# Patient Record
Sex: MALE | Race: WHITE | NOT HISPANIC OR LATINO | Employment: UNEMPLOYED | URBAN - METROPOLITAN AREA
[De-identification: names, ages, dates, MRNs, and addresses within clinical notes are randomized per-mention and may not be internally consistent; named-entity substitution may affect disease eponyms.]

---

## 2017-01-22 ENCOUNTER — HOSPITAL ENCOUNTER (INPATIENT)
Facility: HOSPITAL | Age: 57
LOS: 2 days | Discharge: HOME | DRG: 123 | End: 2017-01-24
Attending: EMERGENCY MEDICINE | Admitting: PSYCHIATRY & NEUROLOGY
Payer: COMMERCIAL

## 2017-01-22 ENCOUNTER — TELEMEDICINE (OUTPATIENT)
Dept: TELEMEDICINE | Facility: OTHER | Age: 57
End: 2017-01-22

## 2017-01-22 DIAGNOSIS — E11.9 CONTROLLED TYPE 2 DIABETES MELLITUS WITHOUT COMPLICATION, WITHOUT LONG-TERM CURRENT USE OF INSULIN: ICD-10-CM

## 2017-01-22 DIAGNOSIS — E78.2 MIXED HYPERLIPIDEMIA: ICD-10-CM

## 2017-01-22 DIAGNOSIS — I10 ESSENTIAL HYPERTENSION: ICD-10-CM

## 2017-01-22 DIAGNOSIS — H54.61 VISION LOSS OF RIGHT EYE: Primary | ICD-10-CM

## 2017-01-22 DIAGNOSIS — H34.10 CENTRAL RETINAL ARTERY OCCLUSION, UNSPECIFIED LATERALITY: ICD-10-CM

## 2017-01-22 DIAGNOSIS — H34.11 CENTRAL RETINAL ARTERY OCCLUSION OF RIGHT EYE: ICD-10-CM

## 2017-01-22 PROBLEM — H34.231 RETINAL ARTERY BRANCH OCCLUSION OF RIGHT EYE: Status: ACTIVE | Noted: 2017-01-22

## 2017-01-22 PROBLEM — I63.132 STROKE DUE TO EMBOLISM OF LEFT CAROTID ARTERY: Status: RESOLVED | Noted: 2017-01-22 | Resolved: 2017-01-22

## 2017-01-22 PROBLEM — I63.132 STROKE DUE TO EMBOLISM OF LEFT CAROTID ARTERY: Status: ACTIVE | Noted: 2017-01-22

## 2017-01-22 LAB
ABO + RH BLD: NORMAL
ALBUMIN SERPL BCP-MCNC: 4.2 G/DL
ALP SERPL-CCNC: 74 U/L
ALT SERPL W/O P-5'-P-CCNC: 45 U/L
ANION GAP SERPL CALC-SCNC: 10 MMOL/L
AST SERPL-CCNC: 37 U/L
BASOPHILS # BLD AUTO: 0.02 K/UL
BASOPHILS NFR BLD: 0.3 %
BILIRUB SERPL-MCNC: 0.8 MG/DL
BLD GP AB SCN CELLS X3 SERPL QL: NORMAL
BUN SERPL-MCNC: 14 MG/DL
CALCIUM SERPL-MCNC: 9.4 MG/DL
CHLORIDE SERPL-SCNC: 99 MMOL/L
CHOLEST/HDLC SERPL: 2.4 {RATIO}
CO2 SERPL-SCNC: 27 MMOL/L
CREAT SERPL-MCNC: 0.8 MG/DL
DIFFERENTIAL METHOD: ABNORMAL
EOSINOPHIL # BLD AUTO: 0.1 K/UL
EOSINOPHIL NFR BLD: 1.7 %
ERYTHROCYTE [DISTWIDTH] IN BLOOD BY AUTOMATED COUNT: 12 %
EST. GFR  (AFRICAN AMERICAN): >60 ML/MIN/1.73 M^2
EST. GFR  (NON AFRICAN AMERICAN): >60 ML/MIN/1.73 M^2
GLUCOSE SERPL-MCNC: 132 MG/DL
HCT VFR BLD AUTO: 43.3 %
HDL/CHOLESTEROL RATIO: 41.6 %
HDLC SERPL-MCNC: 137 MG/DL
HDLC SERPL-MCNC: 57 MG/DL
HGB BLD-MCNC: 16 G/DL
INR PPP: 1
LDLC SERPL CALC-MCNC: 53.4 MG/DL
LIPASE SERPL-CCNC: 8 U/L
LYMPHOCYTES # BLD AUTO: 1.2 K/UL
LYMPHOCYTES NFR BLD: 18.5 %
MAGNESIUM SERPL-MCNC: 1.8 MG/DL
MCH RBC QN AUTO: 32.7 PG
MCHC RBC AUTO-ENTMCNC: 37 %
MCV RBC AUTO: 88 FL
MONOCYTES # BLD AUTO: 0.9 K/UL
MONOCYTES NFR BLD: 13.2 %
NEUTROPHILS # BLD AUTO: 4.3 K/UL
NEUTROPHILS NFR BLD: 66 %
NONHDLC SERPL-MCNC: 80 MG/DL
PLATELET # BLD AUTO: 196 K/UL
PMV BLD AUTO: 9.4 FL
POCT GLUCOSE: 117 MG/DL (ref 70–110)
POTASSIUM SERPL-SCNC: 3.7 MMOL/L
PROT SERPL-MCNC: 7.3 G/DL
PROTHROMBIN TIME: 10.4 SEC
RBC # BLD AUTO: 4.9 M/UL
SODIUM SERPL-SCNC: 136 MMOL/L
TRIGL SERPL-MCNC: 133 MG/DL
TSH SERPL DL<=0.005 MIU/L-ACNC: 2.1 UIU/ML
WBC # BLD AUTO: 6.53 K/UL

## 2017-01-22 PROCEDURE — 86900 BLOOD TYPING SEROLOGIC ABO: CPT

## 2017-01-22 PROCEDURE — 83690 ASSAY OF LIPASE: CPT

## 2017-01-22 PROCEDURE — 85025 COMPLETE CBC W/AUTO DIFF WBC: CPT

## 2017-01-22 PROCEDURE — 80053 COMPREHEN METABOLIC PANEL: CPT

## 2017-01-22 PROCEDURE — 25000003 PHARM REV CODE 250: Performed by: STUDENT IN AN ORGANIZED HEALTH CARE EDUCATION/TRAINING PROGRAM

## 2017-01-22 PROCEDURE — 99291 CRITICAL CARE FIRST HOUR: CPT | Mod: ,,, | Performed by: EMERGENCY MEDICINE

## 2017-01-22 PROCEDURE — 96374 THER/PROPH/DIAG INJ IV PUSH: CPT

## 2017-01-22 PROCEDURE — 86901 BLOOD TYPING SEROLOGIC RH(D): CPT

## 2017-01-22 PROCEDURE — 99223 1ST HOSP IP/OBS HIGH 75: CPT | Mod: ,,, | Performed by: PSYCHIATRY & NEUROLOGY

## 2017-01-22 PROCEDURE — 80061 LIPID PANEL: CPT

## 2017-01-22 PROCEDURE — 85610 PROTHROMBIN TIME: CPT

## 2017-01-22 PROCEDURE — 96361 HYDRATE IV INFUSION ADD-ON: CPT

## 2017-01-22 PROCEDURE — 99223 1ST HOSP IP/OBS HIGH 75: CPT | Mod: ,,, | Performed by: NEUROLOGICAL SURGERY

## 2017-01-22 PROCEDURE — 83036 HEMOGLOBIN GLYCOSYLATED A1C: CPT

## 2017-01-22 PROCEDURE — 12000002 HC ACUTE/MED SURGE SEMI-PRIVATE ROOM

## 2017-01-22 PROCEDURE — 99243 OFF/OP CNSLTJ NEW/EST LOW 30: CPT | Mod: GT,,, | Performed by: PSYCHIATRY & NEUROLOGY

## 2017-01-22 PROCEDURE — 93010 ELECTROCARDIOGRAM REPORT: CPT | Mod: ,,, | Performed by: INTERNAL MEDICINE

## 2017-01-22 PROCEDURE — 84443 ASSAY THYROID STIM HORMONE: CPT

## 2017-01-22 PROCEDURE — 83735 ASSAY OF MAGNESIUM: CPT

## 2017-01-22 PROCEDURE — 99291 CRITICAL CARE FIRST HOUR: CPT | Mod: 25

## 2017-01-22 RX ORDER — SODIUM CHLORIDE 9 MG/ML
INJECTION, SOLUTION INTRAVENOUS CONTINUOUS
Status: DISCONTINUED | OUTPATIENT
Start: 2017-01-22 | End: 2017-01-24 | Stop reason: HOSPADM

## 2017-01-22 RX ORDER — INSULIN ASPART 100 [IU]/ML
1-10 INJECTION, SOLUTION INTRAVENOUS; SUBCUTANEOUS
Status: DISCONTINUED | OUTPATIENT
Start: 2017-01-22 | End: 2017-01-24 | Stop reason: HOSPADM

## 2017-01-22 RX ORDER — LISINOPRIL 20 MG/1
20 TABLET ORAL DAILY
Status: DISCONTINUED | OUTPATIENT
Start: 2017-01-23 | End: 2017-01-24 | Stop reason: HOSPADM

## 2017-01-22 RX ORDER — LABETALOL HYDROCHLORIDE 5 MG/ML
10 INJECTION, SOLUTION INTRAVENOUS EVERY 4 HOURS PRN
Status: DISCONTINUED | OUTPATIENT
Start: 2017-01-22 | End: 2017-01-24 | Stop reason: HOSPADM

## 2017-01-22 RX ORDER — ATORVASTATIN CALCIUM 10 MG/1
10 TABLET, FILM COATED ORAL DAILY
Status: ON HOLD | COMMUNITY
End: 2017-01-24 | Stop reason: HOSPADM

## 2017-01-22 RX ORDER — GLUCAGON 1 MG
1 KIT INJECTION
Status: DISCONTINUED | OUTPATIENT
Start: 2017-01-22 | End: 2017-01-24 | Stop reason: HOSPADM

## 2017-01-22 RX ORDER — ASPIRIN 81 MG/1
81 TABLET ORAL DAILY
Status: ON HOLD | COMMUNITY
End: 2017-01-24 | Stop reason: HOSPADM

## 2017-01-22 RX ORDER — ATORVASTATIN CALCIUM 20 MG/1
40 TABLET, FILM COATED ORAL DAILY
Status: DISCONTINUED | OUTPATIENT
Start: 2017-01-23 | End: 2017-01-24 | Stop reason: HOSPADM

## 2017-01-22 RX ORDER — METFORMIN HYDROCHLORIDE 850 MG/1
850 TABLET ORAL 2 TIMES DAILY WITH MEALS
COMMUNITY

## 2017-01-22 RX ORDER — AMLODIPINE BESYLATE 5 MG/1
5 TABLET ORAL DAILY
Status: DISCONTINUED | OUTPATIENT
Start: 2017-01-23 | End: 2017-01-22

## 2017-01-22 RX ORDER — SODIUM CHLORIDE 0.9 % (FLUSH) 0.9 %
3 SYRINGE (ML) INJECTION EVERY 8 HOURS
Status: DISCONTINUED | OUTPATIENT
Start: 2017-01-22 | End: 2017-01-24 | Stop reason: HOSPADM

## 2017-01-22 RX ORDER — IBUPROFEN 200 MG
24 TABLET ORAL
Status: DISCONTINUED | OUTPATIENT
Start: 2017-01-22 | End: 2017-01-24 | Stop reason: HOSPADM

## 2017-01-22 RX ORDER — AMLODIPINE BESYLATE 10 MG/1
10 TABLET ORAL DAILY
Status: DISCONTINUED | OUTPATIENT
Start: 2017-01-23 | End: 2017-01-24 | Stop reason: HOSPADM

## 2017-01-22 RX ORDER — ONDANSETRON 2 MG/ML
8 INJECTION INTRAMUSCULAR; INTRAVENOUS EVERY 8 HOURS PRN
Status: DISCONTINUED | OUTPATIENT
Start: 2017-01-22 | End: 2017-01-24 | Stop reason: HOSPADM

## 2017-01-22 RX ORDER — AMLODIPINE BESYLATE 10 MG/1
10 TABLET ORAL DAILY
COMMUNITY

## 2017-01-22 RX ORDER — AMOXICILLIN 250 MG
1 CAPSULE ORAL 2 TIMES DAILY
Status: DISCONTINUED | OUTPATIENT
Start: 2017-01-22 | End: 2017-01-24 | Stop reason: HOSPADM

## 2017-01-22 RX ORDER — HYDRALAZINE HYDROCHLORIDE 20 MG/ML
10 INJECTION INTRAMUSCULAR; INTRAVENOUS EVERY 4 HOURS PRN
Status: DISCONTINUED | OUTPATIENT
Start: 2017-01-22 | End: 2017-01-24 | Stop reason: HOSPADM

## 2017-01-22 RX ORDER — IBUPROFEN 200 MG
16 TABLET ORAL
Status: DISCONTINUED | OUTPATIENT
Start: 2017-01-22 | End: 2017-01-24 | Stop reason: HOSPADM

## 2017-01-22 RX ORDER — TERAZOSIN 5 MG/1
5 CAPSULE ORAL NIGHTLY
COMMUNITY

## 2017-01-22 RX ADMIN — Medication 3 ML: at 10:01

## 2017-01-22 RX ADMIN — SODIUM CHLORIDE: 0.9 INJECTION, SOLUTION INTRAVENOUS at 04:01

## 2017-01-22 RX ADMIN — STANDARDIZED SENNA CONCENTRATE AND DOCUSATE SODIUM 1 TABLET: 8.6; 5 TABLET, FILM COATED ORAL at 10:01

## 2017-01-22 NOTE — TELEMEDICINE CONSULT
[facility]/Vascular Neurology  Tele-Consult    Consultation started: 1/22/2017 at 11:16 AM   Consulting Provider:  Andi  The patient location is [facility] Emergency Department  Spoke hospital nurse at bedside with patient assisting consultant.     Subjective:   Subjective   History of Present Illness:  Prince Corona is a 57 y.o. male who presents with R monocular vision loss after sneezing at 0800 this AM. He sneezed and then blew his nose and then suddenly realized that he couldn't see out of his right eye. According to physical examination and fundoscopy from the ED physician, there is no evidence of retinal detachment or vitreous hemorrhage however there is clear changes that appear to be pale changes consistent with ischemia of the affected eye.    These symptoms have never happened before. There are no identified triggers or modifying factors. There have been no recurrent events. There are no other associated symptoms.    Wake up Stroke?: no  Last known normal:  0800  Recent bleeding noted: no  Does the patient take any Blood Thinners? no         H&P was obtained from patient.  Past Medical History: hypertension, diabetes and high cholesterol  Norvasc, aspirin, lipitor, glucphage    Medications: No current outpatient prescriptions on file.    Allergies: Review of patient's allergies indicates:  Allergies not on file    Objective:     Vitals: There were no vitals filed for this visit.     Objective   Physical Exam:  General: well developed, well nourished   HENT: Head:normocephalic and atraumatic   HENT: Ears: right ear normal and left ear normal  Nose: normal nares and no discharge  Eyes:conjunctivae/corneas clear. PERRL.  Neck: normal, no obvious masses and trachea to midline  Lungs: normal respiratory effort  Cardiovascular: Heart: regular rate and rhythm   Cardiovascular: Extremities: no cyanosis, no edema and no clubbing  Abdomen: appears normal and not distended  Skin:  skin color and texture  normal, no rash and no bruises  Musculoskeletal: normal range of motion in all extremities       Imaging Notes: No hemmorhage. No mass effect. No early infarct signs. Impression performed at: 1143    NIH Stroke Scale:  Interval: baseline (upon arrival/admit)  Level of Consciousness: 0 - alert  LOC Questions: 0 - answers both correctly  LOC Commands: 0 - performs both correctly  Best Gaze: 0 - normal  Visual: 1 - partial hemianopia  Facial Palsy: 0 - normal  Motor Left Arm: 0 - no drift  Motor Right Arm: 0 - no drift  Motor Left Le - no drift  Motor Right Le - no drift  Limb Ataxia: 0 - absent  Sensory: 0 - normal  Best Language: 0 - no aphasia  Dysarthria: 0 - normal articulation  Extinction and Inattention: 0 - no neglect  NIH Stroke Scale Total: 1        Assessment - Diagnosis - Goals:     Plan     Diagnosis/Impression: JACOB AVERY; discussed at length with patient regarding risk and benefit of IVtPA; we discussed the probability that the condition may be a blood clot that is blocking blood flow to the eye and that this is the standard treatment and that if symptoms persist, catheterization is a potential treatment as well; case has been discussed w/ Dr. Bowen at [facility] re: potential.    Also I requested and reviewed myself the images of the CTA as they were acquired - they do not show an ipsilateral dissection of the extracranial carotid artery; there is mild atherosclerotic changes bilaterally at the origins of the ICA    TPA Recommendation:   tPA Recommendation   tPA Total Dose:          Bolus Dose       Intravenously over 1 minute (10% of Total Dose)   Infusion Dose       Continuous Infusion over 60 Minutes     Additional Recommendations:   1. Neurological assessment and vital signs (except temperature) every 15 minutes during tPA infusion.  2. Frequency of BP assessments may need to be increased if systolic BP stays >= 180 mm Hg or diastolic BP stays >= 105 mm Hg. Administer antihypertensive meds as  ordered  3. Continue to monitor and control blood pressure and monitor for neurological deterioration every 15 minutes for the first hour after the infusion is stopped. Then every 30 minutes for the next 6 hours. Perform hourly monitoring from the 8th post-infusion hour until 24 hours post-infusion.  4. Temperature every 4 hours or as required.  5. Follow hospital protocol for further orders re: post tPA infusion patient management.  6. No antithrombotics or anticoagulants (including but not limited to: heparin, warfarin, aspirin, clopidigrel, or dipyridamole) for 24 hours, then start antithrombotics as ordered by treating physician    Adapted from the American Heart Association/American Stroke Association (AHA/ASA) and American Association of Neuroscience Nurses (AANN) Guidelines.  Recommendation given at: 1145    Recommendation: NPO until after pass dysphagia screen. Diagnostic studies: CTA Head to assess vasculature , CTA Neck/Arch to assess vasculature, HgbA1C to assess blood glucose levels, Lipid Profile to assess cholesterol levels, MRI head without contrast to assess brain parenchyma, Trans-thoracic cardiac echo to assess cardiac function/status  Consults: Rehab Consult; Occupational Therapy and Rehab Consult; Physical Therapy  Antithrombotics: Hold all Antithrombotics x 24 hrs after IV t-PA given  Statins: Atorvastatin- 40 mg oral daily    Disposition Recommendation:  transfer to [facility] by  air  stat       Possible Interventional Revascularization Candidate? Yes    Recommended the emergency room physician to have a brief discussion with the patient and/or family if available regarding the risks and benefits of treatment, and to briefly document the occurrence of that discussion in his clinical encounter note.     The attending portion of this evaluation, treatment, and documentation was performed per Tristin Blandon via audiovisual.      Billing code:  (time dependent stroke, complex case,  unstable patient, hemorrhages, any intervention, some mimics)    · This patient has a very critical neurological condition/illness, with very high morbidity and mortality.  · There is a very high probability for acute neurological change leading to clinical and possibly life-threatening deterioration requiring highest level of physician preparedness for urgent intervention.  · There is possibility that this condition will require treatment with high risk medications as quickly as possible.  · There is also a possibility that the patient may benefit from further, more advance complex therapies (e.g. endovascular therapy) that will require prompt diagnosis and care.  · Care was coordinated with other physicians involved in the patient's care.  · Radiologic studies and laboratory data were reviewed and interpreted, and plan of care was re-assessed based on the results.  · Diagnosis, treatment options and prognosis may have been discussed with the patient and/or family members or caregiver.  Further advanced medical management and further evaluation is warranted for his care.      Consultation ended: 1/22/2017 at 1157    Parish Blandon MD  Vascular Neurology

## 2017-01-22 NOTE — CONSULTS
Chief complaint/Reason for Consult:     History of Present Illness: Prince Corona is a 57 y.o. male who presents as a transfer from [facility] in [place] for sudden, painless vision loss in the right eye that began around 8:00am this morning.  Pt states this morning he had a sneezing fit and then blew his nose. Afterwards he notes that his vision deteriorated quickly in the right eye. He said the vision was kind of foggy and he thought something was in it. He did not have any flashes or floaters before he lost his vision and there was no FBS. He notes that the vision started to clear up some after he got tPA at the OSH (around 11:00).     POcularHx: no past ocular surgeries, wears reading glasses      Current eye gtts: none      PMHx:  has a past medical history of Diabetes mellitus and Hypertension.     PSurgHx:  has no past surgical history on file.     Home Medications:   Prior to Admission medications    Medication Sig Start Date End Date Taking? Authorizing Provider   amlodipine (NORVASC) 10 MG tablet Take 10 mg by mouth once daily.   Yes Historical Provider, MD   aspirin (ECOTRIN) 81 MG EC tablet Take 81 mg by mouth once daily.   Yes Historical Provider, MD   atorvastatin (LIPITOR) 10 MG tablet Take 10 mg by mouth once daily.   Yes Historical Provider, MD PEACE LISINOPRIL-HCTZ 20-25 Take 1 tablet by mouth once daily. For investigational use only.   Yes Historical Provider, MD   metformin (GLUCOPHAGE) 850 MG tablet Take 850 mg by mouth 2 (two) times daily with meals.   Yes Historical Provider, MD   terazosin (HYTRIN) 5 MG capsule Take 5 mg by mouth every evening.   Yes Historical Provider, MD        Medications this encounter:     Allergies: is allergic to penicillins.     Social:  reports that he has been smoking.  He does not have any smokeless tobacco history on file. He reports that he drinks alcohol.     Family Hx: No family history of glaucoma. family history is not on file.     ROS: Negative x 10  except for complaints as described in HPI; negative for fever, chills, weight loss, nausea, vomiting, diarrhea, shortness of breath, nasal discharge, cough, abdominal pain, dyspnea, difficulty moving arms and legs, confusion, dysuria, palpitations, or chest pain     Ocular examination/Dilated fundus examination:  Base Eye Exam     Visual Acuity (Snellen - Linear)      Right Left   Near sc HM centrally, CF peripherally 20/20         Tonometry (Tonopen, 3:16 PM)      Right Left   Pressure 17 14         Pupils      Dark Light React APD   Right 3 2 Brisk +1   Left 3 2 Brisk None         Visual Fields      Right Left   Result  Full   Restrictions Total superior temporal, inferior temporal, superior nasal, inferior nasal deficiencies          Extraocular Movement      Right Left   Result Full Full         Neuro/Psych     Oriented x3:  Yes      Dilation     Both eyes:  1% Mydriacyl, 2.5% Phenylephrine @ 3:16 PM            Slit Lamp and Fundus Exam     External Exam      Right Left    External Normal Normal      Pen Light Exam      Right Left    Lids/Lashes Normal Normal    Conjunctiva/Sclera White and quiet White and quiet    Cornea Clear Clear    Anterior Chamber Deep and quiet Deep and quiet    Iris Round and reactive Round and reactive    Lens tr NSC tr NSC    Vitreous Normal Normal      Fundus Exam      Right Left    Disc peripheral whitening of RNFL  Normal    C/D Ratio 0.1 0.1    Macula cherry red spot  Normal    Vessels tortuous Normal    Periphery No tears or breaks Normal                Assessment/Plan:     1. CRAO OD  -patient with sudden painless vision loss that started this morning  -received tPA at OSH  -no plaque seen on exam today; cherry red spot visible in macula, crowded optic nerve  -no indication for further intervention from ophthalmology standpoint  -patient will be admitted to [facility] for monitoring  -rec workup per stroke team including carotid doppler and echo  -discussed imporatnce of BP, BG and  cholesterol control to help prevent future occurrences   -patient will need follow up in 2-4 weeks with an ophthalmologist (retina specialist) to evaluate for neovascularization     Discussed patient's history, physical, assessment and plan with the attending     EDUARDA Michaud MD  [employer] Ophthalmology PGY-2

## 2017-01-22 NOTE — ED PROVIDER NOTES
"Encounter Date: 1/22/2017    SCRIBE #1 NOTE: I, Shorty Cantu, am scribing for, and in the presence of,  Dr. Steele. I have scribed the entire note.       History     Chief Complaint   Patient presents with    TPA Transfer     transfer from [facility] for Retinal Occlusion     Review of patient's allergies indicates:  Allergies not on file  HPI Comments: Time patient was seen by the provider: 2:35 PM      The patient is a 57 y.o. male with hx of: HTN and DM that presents to the ED as a transfer from [facility] for evaluation of retinal occlusion with a complaint of acute onset of right eye visual loss, which began 6 hours ago. Pt states his vision is "dark and purple". Left sided vision is at baseline. He denies any eye pain, numbness, paraesthesia, or headache. No known history of stroke. Pt received tpa at the outside facility and states vision loss was very mildly alleviated afterwards.     The history is provided by the patient.     Past Medical History   Diagnosis Date    Diabetes mellitus     Hypertension      No past medical history pertinent negatives.  History reviewed. No pertinent past surgical history.  History reviewed. No pertinent family history.  Social History   Substance Use Topics    Smoking status: Light Tobacco Smoker    Smokeless tobacco: None    Alcohol use Yes     Review of Systems   Constitutional: Negative for fever.   HENT: Negative for sore throat.    Eyes: Negative for pain.        Right sided vision loss   Respiratory: Negative for shortness of breath.    Cardiovascular: Negative for chest pain.   Gastrointestinal: Negative for nausea.   Genitourinary: Negative for dysuria.   Musculoskeletal: Negative for back pain.   Skin: Negative for rash.   Neurological: Negative for weakness, numbness and headaches.        No paraesthesia   Hematological: Does not bruise/bleed easily.       Physical Exam   Initial Vitals   BP Pulse Resp Temp SpO2   01/22/17 1421 01/22/17 1421 " 01/22/17 1421 01/22/17 1424 01/22/17 1423   130/90 98 16 98.6 °F (37 °C) 98 %     Physical Exam    Nursing note and vitals reviewed.  Constitutional: He appears well-developed and well-nourished. No distress.   HENT:   Head: Normocephalic and atraumatic.   Mouth/Throat: Oropharynx is clear and moist.   Eyes:   Right eye visual deficits, can see shapes and movement    Neck: Normal range of motion.   Cardiovascular: Normal rate, regular rhythm and normal heart sounds.   Pulmonary/Chest: Breath sounds normal. No respiratory distress.   Abdominal: Soft. He exhibits no distension. There is no tenderness.   Musculoskeletal: Normal range of motion.   Neurological: He is alert and oriented to person, place, and time.   Skin: Skin is warm and dry.         ED Course   Procedures  Labs Reviewed   CBC W/ AUTO DIFFERENTIAL - Abnormal; Notable for the following:        Result Value    MCH 32.7 (*)     MCHC 37.0 (*)     All other components within normal limits   COMPREHENSIVE METABOLIC PANEL - Abnormal; Notable for the following:     Glucose 132 (*)     ALT 45 (*)     All other components within normal limits   LIPID PANEL - Abnormal; Notable for the following:     LDL Cholesterol 53.4 (*)     All other components within normal limits    Narrative:     ADD ON LIPID AND TSH PER DR MONTSERRAT RIOJAS/ORDER# [id number] AND   [id number] @ 16:26 1/22/17  ADD ON PER DR RIOJAS ORDER [id number] A1C @1644 1/22/17   POCT GLUCOSE - Abnormal; Notable for the following:     POCT Glucose 117 (*)     All other components within normal limits   PROTIME-INR   MAGNESIUM   LIPASE   LIPID PANEL   HEMOGLOBIN A1C   TSH   TSH    Narrative:     ADD ON LIPID AND TSH PER DR MONTSERRAT RIOJAS/ORDER# [id number] AND   [id number] @ 16:26 1/22/17  ADD ON PER DR RIOJAS ORDER [id number] A1C @1644 1/22/17   HEMOGLOBIN A1C    Narrative:     ADD ON LIPID AND TSH PER DR MONTSERRAT RIOJAS/ORDER# [id number] AND   [id number] @ 16:26 1/22/17  ADD ON PER DR RIOJAS ORDER  [id number] A1C @1644 1/22/17   TYPE & SCREEN   POCT GLUCOSE MONITORING CONTINUOUS     EKG Readings: (Independently Interpreted)   Sinus at 92, no acute ischemic changes          Medical Decision Making:   History:   Old Medical Records: I decided to obtain old medical records.  Initial Assessment:   Emergent evaluation of transfer concerning for vascular occlusion of the right eye. He has had painless loss of right eye. He was evaluated by vascular neurology at outside facility and given tpa prior to transfer. The vision is not changed since he was given tpa and vascular neurology, neuro ICU, neurosurgery, and opthalmology are involved in his care and he will be admitted.    Clinical Tests:   Lab Tests: Ordered and Reviewed  Medical Tests: Ordered and Reviewed            Scribe Attestation:   Scribe #1: I performed the above scribed service and the documentation accurately describes the services I performed. I attest to the accuracy of the note.    Attending Attestation:         Attending Critical Care:   Critical Care Times:   Direct Patient Care (initial evaluation, reassessments, and time considering the case)................................................................20 minutes.   Additional History from reviewing old medical records or taking additional history from the family, EMS, PCP, etc.......................5 minutes.   Ordering, Reviewing, and Interpreting Diagnostic Studies...............................................................................................................5 minutes.   Documentation..................................................................................................................................................................................5 minutes.   Consultation with other Physicians. .................................................................................................................................................5 minutes.    ==============================================================  · Total Critical Care Time - exclusive of procedural time: 40 minutes.  ==============================================================  Critical care was necessary to treat or prevent imminent or life-threatening deterioration of the following conditions: stroke.     Physician Attestation for Scribe:  Physician Attestation Statement for Scribe #1: I, Dr. Steele, reviewed documentation, as scribed by Shorty Cantu in my presence, and it is both accurate and complete.         Attending ED Notes:   3:13 PM  Appropriate consults have been made.  The patient is not showing any signs of neurologic failure or adverse effects from the TPA.  Pt will be admitted to the neuro ICU          ED Course     Clinical Impression:   The primary encounter diagnosis was Vision loss of right eye. Diagnoses of Controlled type 2 diabetes mellitus without complication, without long-term current use of insulin, Essential hypertension, and Mixed hyperlipidemia were also pertinent to this visit.    Disposition:   Disposition: Admitted       Allyson Steele MD  01/22/17 2049       Allyson Steele MD  01/22/17 2051

## 2017-01-22 NOTE — H&P
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 male w/ a significant PMHx of HTN, T2DM, HLD who presents to [facility] ED from [facility] S/P tPA for right eye retinal artery occlusion. Patient states he experienced a sneezing fit, after which he blew his nose and then noticed a change in his vision. Originally thought to have something in his eye but did not see anything in the mirror. This occurred at approximately 0800 this morning 01/22/17. Patient denies any pain associated w/ the loss of vision. Patient states it started out initially as a dark spot in the center of his vision that progressed to involve the entire eye. Patient denies any other associated symptoms such as dizziness, speech difficulty, numbness, loss of sensation or weakness in any extremities.     After symptoms failed to improve patient went to OSH where a CTA head/neck were performed which was negative for acute infarct of large vessel occlusion. Patient then received tPA at approximately 1100. After getting tPA patient states his vision improved slightly in his periphery. On arrival at [facility] patient was seen by NSGY, Stroke, and Ophthalmology. Patient will not be going for angio and patient does not appear to have an active clot or lesion to target. Patient denies history of previous CVA/TIA, jaw claudication, or right sided facial/temporal pain, denies eye pain, or Hx of arrhthymias.       Past Medical History   Diagnosis Date    Diabetes mellitus     Hypertension      History reviewed. No pertinent past surgical history.  No current facility-administered medications on file prior to encounter.      No current outpatient prescriptions on file prior to encounter.     Review of patient's allergies indicates:   Allergen Reactions    Penicillins Other (See  Comments)     unknown     History reviewed. No pertinent family history.  Social History   Substance Use Topics    Smoking status: Light Tobacco Smoker    Smokeless tobacco: None    Alcohol use Yes        Review of Symptoms:  Constitutional: Denies fevers, weight loss, chills, or weakness.  Eyes: Denies changes in vision.  ENT: Denies dysphagia, nasal discharge, ear pain or discharge.  Cardiovascular: Denies chest pain, palpitations, orthopnea, or claudication.  Respiratory: Denies shortness of breath, cough, hemoptysis, or wheezing.  GI: Denies nausea/vomitting, hematochezia, melena, abd pain, or changes in appetite.  : Denies dysuria, incontinence, or hematuria.  Musculoskeletal: Denies joint pain or myalgias.  Skin/breast: Denies rashes, lumps, lesions, or discharge.  Neurologic: Endorses loss of vision in right eye and loss of depth perception, denies headache, dizziness, vertigo, or paresthesias.  Psychiatric: Denies changes in mood or hallucinations.  Endocrine: Denies polyuria, polydipsia, heat/cold intolerance.  Hematologic/Lymph: Denies lymphadenopathy, easy bruising or easy bleeding.  Allergic/Immunologic: Denies rash, rhinitis.     OBJECTIVE:   Vital Signs (Most Recent):   Temp: 98.6 °F (37 °C) (01/22/17 1424)  Pulse: 100 (01/22/17 1516)  Resp: 18 (01/22/17 1516)  BP: (!) 145/93 (01/22/17 1516)  SpO2: 97 % (01/22/17 1516)    Vital Signs (24h Range):   Temp:  [98.6 °F (37 °C)] 98.6 °F (37 °C)  Pulse:  [] 100  Resp:  [16-20] 18  SpO2:  [97 %-99 %] 97 %  BP: (130-146)/() 145/93    I & O (Last 24h):  No intake or output data in the 24 hours ending 01/22/17 1624    Physical Exam:  GA: Alert, comfortable, no acute distress.   HEENT: No scleral icterus or JVD.   Pulmonary: Clear to auscultation A/P/L. No wheezing, crackles, or rhonchi.  Cardiac: RRR S1 & S2 w/o rubs/murmurs/gallops.   Abdominal: Bowel sounds present x 4. No appreciable hepatosplenomegaly.  Skin: No jaundice, rashes, or visible  lesions.  Neuro:  --GCS: E4 V5 M6  --Mental Status: alert and oriented x3  --CN II-XII grossly intact.   --Pupils 4 mm, right eye fixed and dilated S/P dilation drops in ED, loss of visual acuity of right eye, loss of peripheral fields evident on confrontation test  --Corneal reflex, gag, cough intact.  --LUE strength: 5/5  --RUE strength: 5/5  --LLE strength: 5/5  --RLE strength: 5/5    Lines/Drains/Airway:             Nutrition/Tube Feeds:   Current Diet Order   Procedures    Diet NPO     No food intake until passes KISHORE or evaluated by speech.       Labs:  BMP:    Recent Labs  Lab 01/22/17  1445      K 3.7   CL 99   CO2 27   BUN 14   CREATININE 0.8   *   MG 1.8     LFT:   Lab Results   Component Value Date    AST 37 01/22/2017    ALT 45 (H) 01/22/2017    ALKPHOS 74 01/22/2017    BILITOT 0.8 01/22/2017    ALBUMIN 4.2 01/22/2017    PROT 7.3 01/22/2017     CBC:   Lab Results   Component Value Date    WBC 6.53 01/22/2017    HGB 16.0 01/22/2017    HCT 43.3 01/22/2017    MCV 88 01/22/2017     01/22/2017     Microbiology x 7d:   Microbiology Results (last 7 days)     ** No results found for the last 168 hours. **        Imaging:    CTA head/neck done at OSH. Will obtain records.    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 01/22/2017       Neuro:   - Symptoms of vision loss occurred around 0800 on 01/22/17  - S/P tPA at OSH at approximately 1100  - Q1H neuro checks  - Goal SBP < 180 mmHg as patient received tPA   - Will obtain 2D ECHO, carotid U/S, U/S of extremities (Hx of DVT)  - Will obtain MRI/MRA 24H S/P tPA    Pulmonary:   - No acute issues at this time  - Oxygenation well on room air    Cardiac:   - S/P tPA, goal SBP < 180 mmHg  - Hx of HTN, on  Norvasc 10 mg daily, Lisinopril 20 mg daily  - Atorvastatin 40 mg PO daily  - Pulse:  [] 100  - BP: (130-146)/() 145/93  - 2D ECHO w/ bubble study ordered, will F/U with results    Renal:    - BUN/Cr: 14//0.8  - Patient on 75 mL/hr while NPO  - Will continue to monitor    ID:   - Most recent temp of 98.6 F  - WBC 6.53  - No signs of acute infection    Hem/Onc:   - H/H: 16.0//43.3 on admission  - Platelets of 196  - No signs of active hemorrhage    Endocrine:    - Hx of T2DM  - Last HbA1c was 5.9  - Moderate dose SSI   - On Metformin 850 mg PO BID    Fluids/Electrolytes/Nutrition/GI:   - IVF NS @ 75 mL/hr while NPO  - Will replace electrolytes PRN  - NPO at this time    Lines: PIVs LAC, RAC    PPx:  DVT: SCDs, S/P tPA  Bowel: senna-docusate  PUP: not indicated  VAP: not indicated      Arcenio Mccann MD  [phone]

## 2017-01-22 NOTE — SUBJECTIVE & OBJECTIVE
Symptom/Intervention Times  Last Known Normal Date:: 01/22/17 (01/22/17 1449)  Last Known Normal Time:: 0805 (01/22/17 1449)  Symptom Onset Date:: 01/22/17 (01/22/17 1449)  Symptom Onset Time:: 0805 (01/22/17 1449)  Stroke Team Called Date:: 01/22/17 (01/22/17 1449)  Stroke Team Called Time:: 1422 (01/22/17 1449)  Stroke Team Arrival Date:: 01/22/17 (01/22/17 1449)  Stroke Team Arrival Time:: 1426 (01/22/17 1449)  Intervention Time::  (tPA at outside facility) (01/22/17 1449)    Past Medical History   Diagnosis Date    Diabetes mellitus     Hypertension      History reviewed. No pertinent past surgical history.  History reviewed. No pertinent family history.  Social History   Substance Use Topics    Smoking status: Light Tobacco Smoker    Smokeless tobacco: None    Alcohol use Yes     Review of patient's allergies indicates:   Allergen Reactions    Penicillins Other (See Comments)     unknown     Medications: I have reviewed the current medication administration record.      (Not in a hospital admission)    Review of Systems   Constitutional: Negative for fever.   HENT: Positive for sneezing.    Eyes: Positive for visual disturbance. Negative for pain, discharge and redness.   Respiratory: Negative for cough and shortness of breath.    Cardiovascular: Negative for palpitations.   Gastrointestinal: Negative for nausea and vomiting.   Endocrine: Negative for polydipsia and polyphagia.   Genitourinary: Negative for dysuria.   Musculoskeletal: Negative for gait problem.   Skin: Negative for color change.   Allergic/Immunologic: Negative for immunocompromised state.   Neurological: Negative for speech difficulty, numbness and headaches.   Hematological: Negative for adenopathy.   Psychiatric/Behavioral: Negative for confusion. The patient is not nervous/anxious.      Objective:     Vital Signs (Most Recent):  Temp: 98.6 °F (37 °C) (01/22/17 1424)  Pulse: 94 (01/22/17 1446)  Resp: 20 (01/22/17 1446)  BP: (!) 146/103  (01/22/17 1446)  SpO2: 98 % (01/22/17 1446)    Vital Signs Range (Last 24H):  Temp:  [98.6 °F (37 °C)]   Pulse:  [94-98]   Resp:  [16-20]   BP: (130-146)/()   SpO2:  [98 %-99 %]     Physical Exam   Constitutional: He is oriented to person, place, and time. He appears well-developed and well-nourished.   HENT:   Head: Normocephalic and atraumatic.   Eyes: Pupils are equal, round, and reactive to light.   Neck: Normal range of motion. Neck supple.   Cardiovascular: Normal rate.    Pulmonary/Chest: Effort normal and breath sounds normal.   Abdominal: Soft.   Musculoskeletal: Normal range of motion.   Neurological: He is alert and oriented to person, place, and time. GCS eye subscore is 4 - spontaneous. GCS verbal subscore is 5 - oriented. GCS motor subscore is 6 - obeys commands.   Skin: Skin is warm and dry.   Psychiatric: He has a normal mood and affect.       Neurological Exam:   LOC: alert and follows requests  Language: No aphasia  Speech: No dysarthria  Orientation: Person, Place, Time  Memory: Recent memory intact, Remote memory intact, Age correct, Month correct  Visual Fields (CN II): right eye visual loss  EOM (CN III, IV, VI): Full/intact  Oculocephalics: normal  Pupils (CN III, IV, VI): PERRL, right eyelid ptosis  Facial Sensation (CN V): Symmetric  Facial Movement (CN VII): symmetric facial expression  Hearing (CN VIII): intact bilaterally  Gag Reflex (CN IX, X): normal/symmetric  Shoulder/Neck (CN XI): SCM-Left: Normal ; SCM-Right: Normal ; Shoulder Shrug: Normal/Symetric  Tongue (CN XII): to midline  Reflexes: 2+ throughout  Motor*: Arm Left:  Normal (5/5), Leg Left:   Normal (5/5), Arm Right:   Normal (5/5), Leg Right:   Normal (5/5)  Cerebellar*: Normal limb  Sensation: intact to light touch, temperature and vibration  Tone: Arm-Left: normal; Leg-Left: normal; Arm-Right: normal; Leg-Right: normal    NIH Stroke Scale:  Interval: baseline (upon arrival/admit)  Level of Consciousness: 0 - alert  LOC  Questions: 0 - answers both correctly  LOC Commands: 0 - performs both correctly  Best Gaze: 0 - normal  Visual: 2 - complete hemianopia (R eye vision loss)  Facial Palsy: 0 - normal  Motor Left Arm: 0 - no drift  Motor Right Arm: 0 - no drift  Motor Left Le - no drift  Motor Right Le - no drift  Limb Ataxia: 0 - absent  Sensory: 0 - normal  Best Language: 0 - no aphasia  Dysarthria: 0 - normal articulation  Extinction and Inattention: 0 - no neglect  NIH Stroke Scale Total: 2  [place] Coma Scale:  Best Eye Response: 4 - spontaneous  Best Motor Response: 6 - obeys commands  Best Verbal Response: 5 - oriented  [place] Coma Scale Total: 15  Modified [place] Scale:   Timeline: Prior to symptoms onset  Modified [place] Score: 0 - no symptoms        Laboratory:  CMP: No results for input(s): GLUCOSE, CALCIUM, ALBUMIN, PROT, NA, K, CO2, CL, BUN, CREATININE, ALKPHOS, ALT, AST, BILITOT in the last 24 hours.  BMP: No results for input(s): GLUCOSE, NA, K, CL, CO2, BUN, CREATININE, CALCIUM in the last 168 hours.  CBC: No results for input(s): WBC, RBC, HGB, HCT, PLT, MCV, MCH, MCHC in the last 168 hours.  Lipid Panel: No results for input(s): CHOL, LDLCALC, HDL, TRIG in the last 168 hours.  Coagulation:   Recent Labs  Lab 17  1445   INR 1.0     Platelet Aggregation Study: No results for input(s): PLTAGG, PLTAGINTERP, PLTAGREGLACO, ADPPLTAGGREG in the last 168 hours.  Hgb A1C: No results for input(s): HGBA1C in the last 168 hours.  TSH: No results for input(s): TSH in the last 168 hours.    Diagnostic Results: (from outside facility)  Brain Imaging: CT Head. Date: 17  Acute Pathology: None  Location: N/A  Old Vascular Pathology: None  Location: N/A    Cerebrovascular Imaging: CTA Head. Date: 17  Intracranial Pathology: None  Location: N/A    Cervical Vascular Imaging: CTA Neck. Date: 17  Cervical Vascular Pathology: None  Location: N/A    Cardiac Evaluation: pending   EKG/Telemetry: Sinus rhythm

## 2017-01-22 NOTE — CONSULTS
"[facility]  Vascular Neurology  Comprehensive Stroke Center  Consult Note      Inpatient consult to Vascular (Stroke) Neurology  Consult performed by: LUCY ESCALERA  Consult ordered by: MONTSERRAT RIOJAS  Reason for consult: right eye vision loss        Subjective:     History of Present Illness:  Mr. Corona is a 58yo male with diabetes, hyperlipidemia and hypertension who is a transfer from OSH presenting with sudden right eye vision loss at 8:05am. Patient reports he sneezed and blew his nose and that is when the vision loss started. He denies other speech changes, no weakness, or sensory changes. CT head & CTA H/N at outside facility without acute infarct or large vessel occlusion. Patient received IV tPA and then transferred to [facility] for further care. Upon arrival patient reports his R eye vision is slightly improved to where he can see vague outlines of objects but everything is "dark and purple." Neurosurgery and ophthalmology consulted STAT. Awaiting for decision if patient will go to angio.   Patient reports he takes a lipitor 10mg, blood pressure medications, metformin, and ASA 81mg at home. No prior history of stroke.       Symptom/Intervention Times  Last Known Normal Date:: 01/22/17 (01/22/17 1449)  Last Known Normal Time:: 0805 (01/22/17 1449)  Symptom Onset Date:: 01/22/17 (01/22/17 1449)  Symptom Onset Time:: 0805 (01/22/17 1449)  Stroke Team Called Date:: 01/22/17 (01/22/17 1449)  Stroke Team Called Time:: 1422 (01/22/17 1449)  Stroke Team Arrival Date:: 01/22/17 (01/22/17 1449)  Stroke Team Arrival Time:: 1426 (01/22/17 1449)  Intervention Time::  (tPA at outside facility) (01/22/17 1449)    Past Medical History   Diagnosis Date    Diabetes mellitus     Hypertension      History reviewed. No pertinent past surgical history.  History reviewed. No pertinent family history.  Social History   Substance Use Topics    Smoking status: Light Tobacco Smoker    Smokeless " tobacco: None    Alcohol use Yes     Review of patient's allergies indicates:   Allergen Reactions    Penicillins Other (See Comments)     unknown     Medications: I have reviewed the current medication administration record.      (Not in a hospital admission)    Review of Systems   Constitutional: Negative for fever.   HENT: Positive for sneezing.    Eyes: Positive for visual disturbance. Negative for pain, discharge and redness.   Respiratory: Negative for cough and shortness of breath.    Cardiovascular: Negative for palpitations.   Gastrointestinal: Negative for nausea and vomiting.   Endocrine: Negative for polydipsia and polyphagia.   Genitourinary: Negative for dysuria.   Musculoskeletal: Negative for gait problem.   Skin: Negative for color change.   Allergic/Immunologic: Negative for immunocompromised state.   Neurological: Negative for speech difficulty, numbness and headaches.   Hematological: Negative for adenopathy.   Psychiatric/Behavioral: Negative for confusion. The patient is not nervous/anxious.      Objective:     Vital Signs (Most Recent):  Temp: 98.6 °F (37 °C) (01/22/17 1424)  Pulse: 94 (01/22/17 1446)  Resp: 20 (01/22/17 1446)  BP: (!) 146/103 (01/22/17 1446)  SpO2: 98 % (01/22/17 1446)    Vital Signs Range (Last 24H):  Temp:  [98.6 °F (37 °C)]   Pulse:  [94-98]   Resp:  [16-20]   BP: (130-146)/()   SpO2:  [98 %-99 %]     Physical Exam   Constitutional: He is oriented to person, place, and time. He appears well-developed and well-nourished.   HENT:   Head: Normocephalic and atraumatic.   Eyes: Pupils are equal, round, and reactive to light.   Neck: Normal range of motion. Neck supple.   Cardiovascular: Normal rate.    Pulmonary/Chest: Effort normal and breath sounds normal.   Abdominal: Soft.   Musculoskeletal: Normal range of motion.   Neurological: He is alert and oriented to person, place, and time. GCS eye subscore is 4 - spontaneous. GCS verbal subscore is 5 - oriented. GCS motor  subscore is 6 - obeys commands.   Skin: Skin is warm and dry.   Psychiatric: He has a normal mood and affect.       Neurological Exam:   LOC: alert and follows requests  Language: No aphasia  Speech: No dysarthria  Orientation: Person, Place, Time  Memory: Recent memory intact, Remote memory intact, Age correct, Month correct  Visual Fields (CN II): right eye visual loss  EOM (CN III, IV, VI): Full/intact  Oculocephalics: normal  Pupils (CN III, IV, VI): PERRL, right eyelid ptosis  Facial Sensation (CN V): Symmetric  Facial Movement (CN VII): symmetric facial expression  Hearing (CN VIII): intact bilaterally  Gag Reflex (CN IX, X): normal/symmetric  Shoulder/Neck (CN XI): SCM-Left: Normal ; SCM-Right: Normal ; Shoulder Shrug: Normal/Symetric  Tongue (CN XII): to midline  Reflexes: 2+ throughout  Motor*: Arm Left:  Normal (5/5), Leg Left:   Normal (5/5), Arm Right:   Normal (5/5), Leg Right:   Normal (5/5)  Cerebellar*: Normal limb  Sensation: intact to light touch, temperature and vibration  Tone: Arm-Left: normal; Leg-Left: normal; Arm-Right: normal; Leg-Right: normal    NIH Stroke Scale:  Interval: baseline (upon arrival/admit)  Level of Consciousness: 0 - alert  LOC Questions: 0 - answers both correctly  LOC Commands: 0 - performs both correctly  Best Gaze: 0 - normal  Visual: 2 - complete hemianopia (R eye vision loss)  Facial Palsy: 0 - normal  Motor Left Arm: 0 - no drift  Motor Right Arm: 0 - no drift  Motor Left Le - no drift  Motor Right Le - no drift  Limb Ataxia: 0 - absent  Sensory: 0 - normal  Best Language: 0 - no aphasia  Dysarthria: 0 - normal articulation  Extinction and Inattention: 0 - no neglect  NIH Stroke Scale Total: 2  [place] Coma Scale:  Best Eye Response: 4 - spontaneous  Best Motor Response: 6 - obeys commands  Best Verbal Response: 5 - oriented  [place] Coma Scale Total: 15  Modified [place] Scale:   Timeline: Prior to symptoms onset  Modified Ricardo Score: 0 - no  symptoms        Laboratory:  CMP: No results for input(s): GLUCOSE, CALCIUM, ALBUMIN, PROT, NA, K, CO2, CL, BUN, CREATININE, ALKPHOS, ALT, AST, BILITOT in the last 24 hours.  BMP: No results for input(s): GLUCOSE, NA, K, CL, CO2, BUN, CREATININE, CALCIUM in the last 168 hours.  CBC: No results for input(s): WBC, RBC, HGB, HCT, PLT, MCV, MCH, MCHC in the last 168 hours.  Lipid Panel: No results for input(s): CHOL, LDLCALC, HDL, TRIG in the last 168 hours.  Coagulation:   Recent Labs  Lab 01/22/17  1445   INR 1.0     Platelet Aggregation Study: No results for input(s): PLTAGG, PLTAGINTERP, PLTAGREGLACO, ADPPLTAGGREG in the last 168 hours.  Hgb A1C: No results for input(s): HGBA1C in the last 168 hours.  TSH: No results for input(s): TSH in the last 168 hours.    Diagnostic Results: (from outside facility)  Brain Imaging: CT Head. Date: 1/22/17  Acute Pathology: None  Location: N/A  Old Vascular Pathology: None  Location: N/A    Cerebrovascular Imaging: CTA Head. Date: 1/22/17  Intracranial Pathology: None  Location: N/A    Cervical Vascular Imaging: CTA Neck. Date: 1/22/17  Cervical Vascular Pathology: None  Location: N/A    Cardiac Evaluation: pending   EKG/Telemetry: Sinus rhythm    Assessment/Plan:     Patient is a 57 y.o. year old male with:    Vision loss of right eye  Mr. Corona is a 58yo male who presents with sudden onset of R eye vision loss after sneezing. S/p IV tPA. This is likely an embolic event.     -Antithrombotics for secondary stroke prevention: None: Reason:  Hold all Antithrombotics x 24 hours after IV t-PA administration  -Statins for secondary stroke prevention and hyperlipidemia, if present: Atorvastatin- 40 mg oral daily  -Aggressive risk factor modification: Hypertension, Diabetes, High Cholesterol, Diet and Exercise  - Rehab Efforts: Physical Therapy, Occupational Therapy and Speech and Language Pathology,   -Diagnostics: Ordered/Pending HgbA1C to assess blood glucose levels, Lipid Profile  to assess cholesterol levels, MRI head without contrast to assess brain parenchyma, TSH to assess thyroid function, ECHO   -Neurosurgery and ophthalmology consulted   -VTE Prophylaxis: None: Reason for No Pharmacological VTE Prophylaxis: recent tPA, hold for 24 hours  -BP Parameters: SBP < 160  - Nursing Orders: Neuro checks- q1hour, Antiembolic stockings, Swallowing evaluation by Nursing, Seizure precautions, Out of bed BID, Avoid Arroyo catheter, Pneumatic compression device, Stroke Education, Blood glucose target 100-130, Up with assistance        Diabetes mellitus type II, controlled  -Patient reports well controlled (last A1C 5.5 as OP)  -SS insulin as IP   -Stroke risk factor    Essential hypertension  -Stroke risk factor  -BP goal currently <160 s/p tPA    Mixed hyperlipidemia  -On Atorvastatin 10mg at home  -Awaiting lipid panel as IP  -May increase pending LDL       Thrombolysis Candidate? No  1. Contraindications: rec'd at outside facility  2. Warnings: rec'd at outside facility    Interventional Revascularization Candidate?  No; No large vessel occlusion    Research Candidate? No    Mary Kirby PA-C  [facility]  Department of Vascular Neurology   [facility]

## 2017-01-22 NOTE — ASSESSMENT & PLAN NOTE
Mr. Corona is a 56yo male who presents with sudden onset of R eye vision loss after sneezing. S/p IV tPA. This is likely an embolic event.     -Antithrombotics for secondary stroke prevention: None: Reason:  Hold all Antithrombotics x 24 hours after IV t-PA administration  -Statins for secondary stroke prevention and hyperlipidemia, if present: Atorvastatin- 40 mg oral daily  -Aggressive risk factor modification: Hypertension, Diabetes, High Cholesterol, Diet and Exercise  - Rehab Efforts: Physical Therapy, Occupational Therapy and Speech and Language Pathology,   -Diagnostics: Ordered/Pending HgbA1C to assess blood glucose levels, Lipid Profile to assess cholesterol levels, MRI head without contrast to assess brain parenchyma, TSH to assess thyroid function, ECHO   -Neurosurgery and ophthalmology consulted   -VTE Prophylaxis: None: Reason for No Pharmacological VTE Prophylaxis: recent tPA, hold for 24 hours  -BP Parameters: SBP < 160  - Nursing Orders: Neuro checks- q1hour, Antiembolic stockings, Swallowing evaluation by Nursing, Seizure precautions, Out of bed BID, Avoid Arroyo catheter, Pneumatic compression device, Stroke Education, Blood glucose target 100-130, Up with assistance

## 2017-01-22 NOTE — CONSULTS
Consult Note  Neurosurgery    Consult Requested By: Vascular Stroke  Reason for Consult: assess for possible thrombectomy; s/p tPA    SUBJECTIVE:     History of Present Illness:  Patient is a 57 y.o. male w PMH DM/HTN on [id number] daily at home, presents with acute onset R monocular vision loss that started today at 8:05am. tPA given at 1145am. Since the tPA the patient endorses improvement of his vision though still has residual deficit.    We were consulted to evaluate for possible thrombectomy.     Scheduled Meds:  Continuous Infusions:  PRN Meds:    Review of patient's allergies indicates:   Allergen Reactions    Penicillins Other (See Comments)     unknown       Past Medical History   Diagnosis Date    Diabetes mellitus     Hypertension      History reviewed. No pertinent past surgical history.  History reviewed. No pertinent family history.  Social History   Substance Use Topics    Smoking status: Light Tobacco Smoker    Smokeless tobacco: None    Alcohol use Yes      Review of Systems:  Constitutional: no fever or chills  Eyes: positive for visual disturbance  ENT: no nasal congestion or sore throat  Respiratory: no cough or shortness of breath  Cardiovascular: no chest pain or palpitations  Gastrointestinal: no nausea or vomiting, tolerating diet  Genitourinary: no hematuria or dysuria  Hematologic/Lymphatic: no easy bruising or lymphadenopathy  Musculoskeletal: no arthralgias or myalgias  Neurological: no seizures or tremors    OBJECTIVE:     Vital Signs (Most Recent)  Temp: 98.6 °F (37 °C) (01/22/17 1424)  Pulse: 94 (01/22/17 1446)  Resp: 20 (01/22/17 1446)  BP: (!) 146/103 (01/22/17 1446)  SpO2: 98 % (01/22/17 1446)    Vital Signs Range (Last 24H):  Temp:  [98.6 °F (37 °C)]   Pulse:  [94-98]   Resp:  [16-20]   BP: (130-146)/()   SpO2:  [98 %-99 %]     Physical Exam:  General: well developed, well nourished, no distress  Neurologic: Alert and oriented. Thought content appropriate.  No focal  numbness or weakness  GCS: Motor: 6/Verbal: 5/Eyes: 4 GCS Total: 15  Mental Status: Awake, Alert, Oriented x4, Interactive  Cranial nerves: face symmetric  Sensory: intact to light touch and pin prick throughout  Motor Strength:full strength upper and lower extremities  Pronator Drift: no drift noted   R eye - EOMI, no exopthalmos, not painful, no erythema, pupil reactive    Laboratory:  CBC: No results for input(s): WBC, RBC, HGB, HCT, PLT, MCV, MCH, MCHC in the last 168 hours.  BMP: No results for input(s): GLU, NA, K, CL, CO2, BUN, CREATININE, CALCIUM, MG in the last 168 hours.  Coagulation:   Recent Labs  Lab 01/22/17  1445   INR 1.0     Diagnostic Results:  Outside hospital CTA reviewed w Staff - Opthalmic artery is patent    ASSESSMENT/PLAN:     57M w acute onset monocular vision loss, s/p tPA with improvement since    - CTA reviewed, opthalmic artery is patent and considering patient improvement after tPA I do not believe the patient would benefit from any further intervention.   - Optho saw ischemic change and no plaque.   - Patient discussed with Dr. Bowen    Thank you,    MD Ratna Joaquin/[facility] Resident - [id number]  Pager: [phone]

## 2017-01-22 NOTE — ED TRIAGE NOTES
Pt transfer from another facility. Pt states he sneezed this morning and then loss vision in R eye. Pt states he can see black and purple now out of R eye. Pt received TPA bolus  at 1146 and infusion at 1147. TPA finished in route to [facility] at 1250. Pt placed on cardiac monitor, nibp and pulse ox. Pt has no other complaints.

## 2017-01-22 NOTE — ED NOTES
Patient identifiers verified and correct for Prince Corona.    LOC: The patient is awake, alert and aware of environment with an appropriate affect, the patient is oriented x 3 and speaking appropriately.  APPEARANCE: Patient resting comfortably and in no acute distress, patient is clean and well groomed, patient's clothing is properly fastened.  SKIN: The skin is warm and dry, color consistent with ethnicity, patient has normal skin turgor and moist mucus membranes, skin intact, no breakdown or bruising noted.  MUSCULOSKELETAL: Patient moving all extremities spontaneously, no obvious swelling or deformities noted.  RESPIRATORY: Airway is open and patent, respirations are spontaneous, patient has a normal effort and rate, no accessory muscle use noted, clear bilateral breath sounds.  CARDIAC: Patient has a normal rate and regular rhythm, no periphreal edema noted, capillary refill < 3 seconds.  ABDOMEN: Soft and non tender to palpation, no distention noted, normoactive bowel sounds present in all four quadrants.  NEUROLOGIC: Vision loss R eye. Only see purple and black. Pupils 3mm, equal and reactive. eyes open spontaneously, behavior appropriate to situation, follows commands, facial expression symmetrical, bilateral hand grasp equal and even, purposeful motor response noted, normal sensation in all extremities when touched with a finger.

## 2017-01-22 NOTE — IP AVS SNAPSHOT
[facility]  [address]  [facility] [address]  Phone: [phone]           Patient Discharge Instructions     Our goal is to set you up for success. This packet includes information on your condition, medications, and your home care. It will help you to care for yourself so you don't get sicker and need to go back to the hospital.     Please ask your nurse if you have any questions.        There are many details to remember when preparing to leave the hospital. Here is what you will need to do:    1. Take your medicine. If you are prescribed medications, review your Medication List in the following pages. You may have new medications to  at the pharmacy and others that you'll need to stop taking. Review the instructions for how and when to take your medications. Talk with your doctor or nurses if you are unsure of what to do.     2. Go to your follow-up appointments. Specific follow-up information is listed in the following pages. Your may be contacted by a transition nurse or clinical provider about future appointments. Be sure we have all of the phone numbers to reach you, if needed. Please contact your provider's office if you are unable to make an appointment.     3. Watch for warning signs. Your doctor or nurse will give you detailed warning signs to watch for and when to call for assistance. These instructions may also include educational information about your condition. If you experience any of warning signs to your health, call your doctor.               [facility]  Unless otherwise directed by your provider, please contact [facility], our nurse care line that is available for 24/7 assistance.     [phone] (toll-free)    Registered nurses in the [facility] provide clinical advisement, health education, appointment booking, and other advisory services.                    ** Verify the list of medication(s) below is accurate and up  to date. Carry this with you in case of emergency. If your medications have changed, please notify your healthcare provider.             Medication List      START taking these medications        Additional Info    Begin Date AM Noon PM Bedtime    aspirin 325 MG tablet   Refills:  0   Dose:  325 mg   Replaces:  aspirin 81 MG EC tablet    Instructions:  Take 1 tablet (325 mg total) by mouth once daily.                                 CHANGE how you take these medications        Additional Info    Begin Date AM Noon PM Bedtime    atorvastatin 40 MG tablet   Commonly known as:  LIPITOR   Quantity:  60 tablet   Refills:  0   Dose:  20 mg   What changed:    - medication strength  - how much to take    Last time this was given:  40 mg on 1/24/2017  9:25 AM   Instructions:  Take 0.5 tablets (20 mg total) by mouth once daily.                                 CONTINUE taking these medications        Additional Info    Begin Date AM Noon PM Bedtime    amlodipine 10 MG tablet   Commonly known as:  NORVASC   Refills:  0   Dose:  10 mg    Last time this was given:  10 mg on 1/24/2017  9:25 AM   Instructions:  Take 10 mg by mouth once daily.                            INV LISINOPRIL-HCTZ 20-25   Refills:  0   Dose:  1 tablet    Instructions:  Take 1 tablet by mouth once daily. For investigational use only.                            metformin 850 MG tablet   Commonly known as:  GLUCOPHAGE   Refills:  0   Dose:  850 mg    Instructions:  Take 850 mg by mouth 2 (two) times daily with meals.                            terazosin 5 MG capsule   Commonly known as:  HYTRIN   Refills:  0   Dose:  5 mg    Instructions:  Take 5 mg by mouth every evening.                              STOP taking these medications     aspirin 81 MG EC tablet   Commonly known as:  ECOTRIN   Replaced by:  aspirin 325 MG tablet            Where to Get Your Medications      These medications were sent to [facility] - [address]   "[address] [place] [address]     Phone:  [phone]     atorvastatin 40 MG tablet         Information about where to get these medications is not yet available     ! Ask your nurse or doctor about these medications     aspirin 325 MG tablet                  Please bring to all follow up appointments:    1. A copy of your discharge instructions.  2. All medicines you are currently taking in their original bottles.  3. Identification and insurance card.    Please arrive 15 minutes ahead of scheduled appointment time.    Please call 24 hours in advance if you must reschedule your appointment and/or time.        Referrals     Future Orders    Ambulatory Referral to Ophthalmology         Discharge Instructions     Future Orders    Diet Diabetic 1800 Calories         Primary Diagnosis     Your primary diagnosis was:  Blockage Of Artery In Eye      Admission Information     Date & Time Provider Department CSN    1/22/2017  2:23 PM Duran Bermeo MD [facility] [id number]      Care Providers     Provider Role Specialty Primary office phone    Duran Bermeo MD Attending Provider Neurology [phone]    Huy Lucas MD Team Attending  Neuro Critical Care [phone]    Abiodun Mcnamara MD Team Attending  Neuro Critical Care [phone]    Brien Marroquin MD Team Attending  Interventional Neurology [phone]    Duran Bermeo MD Team Attending  Neurology [phone]      Your Vitals Were     BP Pulse Temp Resp Height Weight    131/95 (BP Location: Left arm, Patient Position: Lying, BP Method: Automatic) 89 97.9 °F (36.6 °C) (Oral) 16 5' 7" (1.702 m) 98.7 kg (217 lb 9.5 oz)    SpO2 BMI             97% 34.08 kg/m2         Recent Lab Values        1/22/2017                           2:45 PM           A1C 6.0           Comment for A1C at  2:45 PM on 1/22/2017:  According to ADA guidelines, hemoglobin A1C <7.0% represents  optimal control in non-pregnant diabetic " patients.  Different  metrics may apply to specific populations.   Standards of Medical Care in Diabetes - 2016.  For the purpose of screening for the presence of diabetes:  <5.7%     Consistent with the absence of diabetes  5.7-6.4%  Consistent with increasing risk for diabetes   (prediabetes)  >or=6.5%  Consistent with diabetes  Currently no consensus exists for use of hemoglobin A1C  for diagnosis of diabetes for children.        Allergies as of 1/24/2017        Reactions    Penicillins Other (See Comments)    unknown      Advance Directives     An advance directive is a document which, in the event you are no longer able to make decisions for yourself, tells your healthcare team what kind of treatment you do or do not want to receive, or who you would like to make those decisions for you.  If you do not currently have an advance directive, [facility] encourages you to create one.  For more information call:  [phone]), [phone] ([phone]),  or log on to [email or web address].        Smoking Cessation     If you would like to quit smoking:   You may be eligible for free services if you are a [place] resident and started smoking cigarettes before September 1, 1988.  Call the Smoking Cessation Trust ([facility]) toll free at (190) [phone] or (135) 635-7710.   Call 0-321-QUIT-NOW if you do not meet the above criteria.            Language Assistance Services     ATTENTION: Language assistance services are available, free of charge. Please call [phone].      ATENCIÓN: Si habla español, tiene a hart disposición servicios gratuitos de asistencia lingüística. Llame al [phone].     CHÚ Ý: N?u b?n nói Ti?ng Vi?t, có các d?ch v? h? tr? ngôn ng? mi?n phí dành cho b?n. G?i s? [phone].        Stroke Education              Diabetes Discharge Instructions                                   [facility] Sign-Up     Activating your [facility] account is as easy as 1-2-3!     1) Visit  [email or web address], select Sign Up Now, enter this activation code and your date of birth, then select Next.  [email or web address]  Expires: 3/10/2017 11:14 AM      2) Create a username and password to use when you visit [facility] in the future and select a security question in case you lose your password and select Next.    3) Enter your e-mail address and click Sign Up!    Additional Information  If you have questions, please e-mail [email or web address] or call [phone] to talk to our [facility] staff. Remember, [facility] is NOT to be used for urgent needs. For medical emergencies, dial 911.          [facility] complies with applicable Federal civil rights laws and does not discriminate on the basis of race, color, national origin, age, disability, or sex.

## 2017-01-23 ENCOUNTER — DOCUMENTATION ONLY (OUTPATIENT)
Dept: CARDIOLOGY | Facility: CLINIC | Age: 57
End: 2017-01-23

## 2017-01-23 LAB
ALBUMIN SERPL BCP-MCNC: 3.7 G/DL
ALP SERPL-CCNC: 66 U/L
ALT SERPL W/O P-5'-P-CCNC: 38 U/L
ANION GAP SERPL CALC-SCNC: 10 MMOL/L
AST SERPL-CCNC: 29 U/L
BASOPHILS # BLD AUTO: 0.03 K/UL
BASOPHILS NFR BLD: 0.6 %
BILIRUB SERPL-MCNC: 1.1 MG/DL
BUN SERPL-MCNC: 13 MG/DL
CALCIUM SERPL-MCNC: 8.7 MG/DL
CHLORIDE SERPL-SCNC: 101 MMOL/L
CO2 SERPL-SCNC: 25 MMOL/L
CREAT SERPL-MCNC: 0.8 MG/DL
DIASTOLIC DYSFUNCTION: NO
DIFFERENTIAL METHOD: ABNORMAL
EOSINOPHIL # BLD AUTO: 0.2 K/UL
EOSINOPHIL NFR BLD: 2.9 %
ERYTHROCYTE [DISTWIDTH] IN BLOOD BY AUTOMATED COUNT: 11.9 %
EST. GFR  (AFRICAN AMERICAN): >60 ML/MIN/1.73 M^2
EST. GFR  (NON AFRICAN AMERICAN): >60 ML/MIN/1.73 M^2
ESTIMATED AVG GLUCOSE: 126 MG/DL
ESTIMATED PA SYSTOLIC PRESSURE: 21.16
GLUCOSE SERPL-MCNC: 156 MG/DL
HBA1C MFR BLD HPLC: 6 %
HCT VFR BLD AUTO: 42 %
HGB BLD-MCNC: 15.2 G/DL
LYMPHOCYTES # BLD AUTO: 1.2 K/UL
LYMPHOCYTES NFR BLD: 22.6 %
MAGNESIUM SERPL-MCNC: 1.8 MG/DL
MCH RBC QN AUTO: 32.1 PG
MCHC RBC AUTO-ENTMCNC: 36.2 %
MCV RBC AUTO: 89 FL
MONOCYTES # BLD AUTO: 0.6 K/UL
MONOCYTES NFR BLD: 11.7 %
NEUTROPHILS # BLD AUTO: 3.2 K/UL
NEUTROPHILS NFR BLD: 62 %
PHOSPHATE SERPL-MCNC: 3.7 MG/DL
PLATELET # BLD AUTO: 177 K/UL
PMV BLD AUTO: 9.2 FL
POCT GLUCOSE: 125 MG/DL (ref 70–110)
POCT GLUCOSE: 132 MG/DL (ref 70–110)
POTASSIUM SERPL-SCNC: 3.5 MMOL/L
PROT SERPL-MCNC: 6.5 G/DL
RBC # BLD AUTO: 4.73 M/UL
RETIRED EF AND QEF - SEE NOTES: 55 (ref 55–65)
SODIUM SERPL-SCNC: 136 MMOL/L
TRICUSPID VALVE REGURGITATION: NORMAL
WBC # BLD AUTO: 5.14 K/UL

## 2017-01-23 PROCEDURE — 92610 EVALUATE SWALLOWING FUNCTION: CPT

## 2017-01-23 PROCEDURE — 25500020 PHARM REV CODE 255: Performed by: PSYCHIATRY & NEUROLOGY

## 2017-01-23 PROCEDURE — G8997 SWALLOW GOAL STATUS: HCPCS | Mod: CH

## 2017-01-23 PROCEDURE — 84100 ASSAY OF PHOSPHORUS: CPT

## 2017-01-23 PROCEDURE — 85025 COMPLETE CBC W/AUTO DIFF WBC: CPT

## 2017-01-23 PROCEDURE — 96374 THER/PROPH/DIAG INJ IV PUSH: CPT

## 2017-01-23 PROCEDURE — 80053 COMPREHEN METABOLIC PANEL: CPT

## 2017-01-23 PROCEDURE — 92523 SPEECH SOUND LANG COMPREHEN: CPT

## 2017-01-23 PROCEDURE — 99233 SBSQ HOSP IP/OBS HIGH 50: CPT | Mod: ,,, | Performed by: PSYCHIATRY & NEUROLOGY

## 2017-01-23 PROCEDURE — 25000003 PHARM REV CODE 250: Performed by: STUDENT IN AN ORGANIZED HEALTH CARE EDUCATION/TRAINING PROGRAM

## 2017-01-23 PROCEDURE — 83735 ASSAY OF MAGNESIUM: CPT

## 2017-01-23 PROCEDURE — A9585 GADOBUTROL INJECTION: HCPCS | Performed by: PSYCHIATRY & NEUROLOGY

## 2017-01-23 PROCEDURE — G8996 SWALLOW CURRENT STATUS: HCPCS | Mod: CH

## 2017-01-23 PROCEDURE — G8998 SWALLOW D/C STATUS: HCPCS | Mod: CH

## 2017-01-23 PROCEDURE — 99232 SBSQ HOSP IP/OBS MODERATE 35: CPT | Mod: ,,, | Performed by: PSYCHIATRY & NEUROLOGY

## 2017-01-23 PROCEDURE — 97161 PT EVAL LOW COMPLEX 20 MIN: CPT

## 2017-01-23 PROCEDURE — 93306 TTE W/DOPPLER COMPLETE: CPT | Mod: 26,,, | Performed by: INTERNAL MEDICINE

## 2017-01-23 PROCEDURE — 97165 OT EVAL LOW COMPLEX 30 MIN: CPT

## 2017-01-23 PROCEDURE — 20000000 HC ICU ROOM

## 2017-01-23 RX ORDER — GADOBUTROL 604.72 MG/ML
10 INJECTION INTRAVENOUS
Status: DISCONTINUED | OUTPATIENT
Start: 2017-01-23 | End: 2017-01-24 | Stop reason: HOSPADM

## 2017-01-23 RX ORDER — ASPIRIN 325 MG
325 TABLET ORAL DAILY
Status: DISCONTINUED | OUTPATIENT
Start: 2017-01-24 | End: 2017-01-23

## 2017-01-23 RX ORDER — HEPARIN SODIUM 5000 [USP'U]/ML
5000 INJECTION, SOLUTION INTRAVENOUS; SUBCUTANEOUS EVERY 8 HOURS
Status: DISCONTINUED | OUTPATIENT
Start: 2017-01-24 | End: 2017-01-24 | Stop reason: HOSPADM

## 2017-01-23 RX ORDER — ASPIRIN 81 MG/1
81 TABLET ORAL DAILY
Status: DISCONTINUED | OUTPATIENT
Start: 2017-01-24 | End: 2017-01-24 | Stop reason: HOSPADM

## 2017-01-23 RX ORDER — GADOBUTROL 604.72 MG/ML
10 INJECTION INTRAVENOUS
Status: COMPLETED | OUTPATIENT
Start: 2017-01-23 | End: 2017-01-23

## 2017-01-23 RX ADMIN — STANDARDIZED SENNA CONCENTRATE AND DOCUSATE SODIUM 1 TABLET: 8.6; 5 TABLET, FILM COATED ORAL at 08:01

## 2017-01-23 RX ADMIN — AMLODIPINE BESYLATE 10 MG: 10 TABLET ORAL at 08:01

## 2017-01-23 RX ADMIN — Medication 3 ML: at 02:01

## 2017-01-23 RX ADMIN — LISINOPRIL 20 MG: 20 TABLET ORAL at 08:01

## 2017-01-23 RX ADMIN — GADOBUTROL 10 ML: 604.72 INJECTION INTRAVENOUS at 04:01

## 2017-01-23 RX ADMIN — Medication 3 ML: at 05:01

## 2017-01-23 RX ADMIN — ATORVASTATIN CALCIUM 40 MG: 20 TABLET, FILM COATED ORAL at 08:01

## 2017-01-23 NOTE — PLAN OF CARE
[facility] - [place] - [facility]  [address]  [place] [address]  Phone: [phone] Fax: [phone]       01/23/17 1546   Discharge Assessment   Assessment Type Discharge Planning Assessment   Confirmed/corrected address and phone number on facesheet? Yes   Assessment information obtained from? Patient;Caregiver   Expected Length of Stay (days) 3   Communicated expected length of stay with patient/caregiver yes   Prior to hospitilization cognitive status: Alert/Oriented   Prior to hospitalization functional status: Independent   Current cognitive status: Alert/Oriented   Current Functional Status: Independent   Arrived From hospice/medical facility   Lives With spouse   Able to Return to Prior Arrangements yes   Is patient able to care for self after discharge? Yes   How many people do you have in your home that can help with your care after discharge? 1   Who are your caregiver(s) and their phone number(s)? (spouse Adamaris Corona @ [phone])   Patient's perception of discharge disposition home or selfcare   Readmission Within The Last 30 Days no previous admission in last 30 days   Patient currently being followed by outpatient case management? No   Patient currently receives home health services? No   Does the patient currently use HME? No   Patient currently receives private duty nursing? No   Patient currently receives any other outside agency services? No   Equipment Currently Used at Home none   Do you have any problems affording any of your prescribed medications? No   Is the patient taking medications as prescribed? yes   Do you have any financial concerns preventing you from receiving the healthcare you need? No   Does the patient have transportation to healthcare appointments? Yes   Transportation Available family or friend will provide   On Dialysis? No   Does the patient receive services at the Coumadin Clinic? No   Are there any open cases? No   Discharge Plan  A Home;Home with family   Discharge Plan B Home;Home Health   Patient/Family In Agreement With Plan yes       Megan Avelar   [id number]

## 2017-01-23 NOTE — PROGRESS NOTES
Called to bedside to perform Bubble study.  Patient identified by 2 identifiers.   Allergies reviewed.  18 g IV in place to Lt AC.  Bubble study explained to patient, patient verbalized understanding.  Bubble performed X 2, with & without Valsalva.  Pt tolerated procedure well.

## 2017-01-23 NOTE — PROGRESS NOTES
Patient arrived to [facility] from [facility] via     Type of Stroke: Ischemic CVA s/p TPA    TPA start time 1147 and end time 1247    Patients current symptoms include: R eye vision impairment    Notified KRAIG Quigley with NCC team of patient arrival.

## 2017-01-23 NOTE — PLAN OF CARE
Problem: Physical Therapy Goal  Goal: Physical Therapy Goal  Outcome: Outcome(s) achieved Date Met:  01/23/17     PT Evaluation complete. Recommending home with support of family upon D/C.     Rasheeda Talley, PT, DPT  [phone]  10/5/2016

## 2017-01-23 NOTE — PT/OT/SLP EVAL
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 No pertinent past surgical history.    Referring physician: Ramy  Date referred to PT: 2017    General Precautions: Standard    Do you have any cultural, spiritual, Mandaeism conflicts, given your current situation?: None noted    Patient History:  Lives With: spouse  Living Environment Comment: Patient lives in [place], [place] in [facility] with 3 ZARI with B rail with spouse. PTA patient was (I) with ADLs including driving. Patient is right handed. Pt enjoys fishing, building, chopping wood, reading. 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   Equipment Currently Used at Home: none  DME owned (not currently used): none    Previous Level of Function:  Ambulation Skills: independent  Transfer Skills: independent  ADL Skills: independent  Work/Leisure Activity: independent    Subjective:  Communicated with RN (Mallory) prior to session.    Chief Complaint: None  Patient goals: "Well, I would love to get up. Can I sit in the chair?"    Pain Ratin/10   Pain Rating Post-Intervention: 0/10    Objective:   Patient found with: blood pressure cuff, pulse ox (continuous), SCD, telemetry, peripheral IV     Cognitive Exam:  Oriented to: Person, Place, Time and Situation    Follows Commands/attention: Follows multistep  commands  Communication: clear/fluent  Safety awareness/insight to disability: intact    Physical Exam:  Postural examination/scapula alignment: Rounded shoulder and Head " forward    Skin integrity: Visible skin intact, Thin and Dry  Edema: None noted    Sensation:   Intact ; upon evaluation and per pt report, no changes in sensation to B LE.    Lower Extremity Range of Motion:  Right Lower Extremity: WNL  Left Lower Extremity: WNL    Lower Extremity Strength: No focal weakness noted  Right Lower Extremity: WNL  Left Lower Extremity: WNL     Gross motor coordination: WFL    Functional Mobility:  Bed Mobility: Independent with HOB flat    Transfers: Independent from EOB, from toilet, from bedside chair.    Gait:   Gait Distance: Pt ambulated x30 feet in hospital room with supervision from PT. No overt LOB or gait instability noted. Pt with good safety awareness and visual scanning noted with OOB activity.  Assistance 1: Supervision  Gait Assistive Device: No device  Gait Pattern: reciprocal  Gait Deviation(s):  (none)    Balance:   Static Sit: GOOD: Takes MODERATE challenges from all directions  Dynamic Sit: GOOD: Maintains balance through MODERATE excursions of active trunk movement  Static Stand: GOOD: Takes MODERATE challenges from all directions  Dynamic stand: GOOD: Needs SUPERVISION only during gait and able to self right with moderate     Therapeutic Activities and Exercises:  Pt requesting ambulation to bathroom; PT educated on need for close supervision 2/2 recent tPA infusion and nearing 24 hour window. PT supervised pt's ambulation to bathroom; however, pt with no deficits noted.     Upon return to bedside, pt instructed to sit in bedside chair and come to standing without use of hands. Pt able to complete this task without difficulty. Pt returned to supine position; HOB elevated per pt comfort.    PT reviewed stroke s/s, mobility needs, and tPA window with pt and pt's spouse. PT reviewed D/C from PT services and continuation with OT while in hospital to address visual deficits. Pt and spouse report no concerns related to return home. Questions/concerns addressed within PT  scope of practice.     AM-PAC 6 CLICK MOBILITY  How much help from another person does this patient currently need?   1 = Unable, Total/Dependent Assistance  2 = A lot, Maximum/Moderate Assistance  3 = A little, Minimum/Contact Guard/Supervision  4 = None, Modified [place]/Independent    Turning over in bed (including adjusting bedclothes, sheets and blankets)?: 4  Sitting down on and standing up from a chair with arms (e.g., wheelchair, bedside commode, etc.): 4  Moving from lying on back to sitting on the side of the bed?: 4  Moving to and from a bed to a chair (including a wheelchair)?: 4  Need to walk in hospital room?: 4  Climbing 3-5 steps with a railing?: 4  Total Score: 24     AM-PAC Raw Score CMS G-Code Modifier Level of Impairment Assistance   6 % Total / Unable   7 - 9 CM 80 - 100% Maximal Assist   10 - 14 CL 60 - 80% Moderate Assist   15 - 19 CK 40 - 60% Moderate Assist   20 - 22 CJ 20 - 40% Minimal Assist   23 CI 1-20% SBA / CGA   24 CH 0% Independent/ Mod I     Patient left HOB elevated with all lines intact, call button in reach, RN notified and family present.    Assessment:   Prince Corona is a 57 y.o. male with a medical diagnosis of Retinal artery branch occlusion of right eye. PTA pt was (I) with mobility and ADLs; driving and working part time. Upon Evaluation, pt demo 5/5 B LE, intact sensation, good safety awareness, and (I) with transfers from all surfaces. Pt in agreement with D/C from PT services due to high level of function and no gait/balance deficits at this time. Please re-consult should pt's status change. D/C Acute PT services 1/23/2017.    Rehab identified problem list/impairments: Rehab identified problem list/impairments: visual deficits    Rehab potential is good.    Activity tolerance: Good    Discharge recommendations: Discharge Facility/Level Of Care Needs: home     Barriers to discharge: Barriers to Discharge: None    Equipment recommendations: Equipment Needed  After Discharge: none     PLAN:    D/C Acute PT services 1/23/2017. Home with support of family.  Plan of Care reviewed with: patient, spouse    Rasheeda Talley, PT, DPT  [phone]  1/23/2017

## 2017-01-23 NOTE — CONSULTS
[facility]  Adult Nutrition  Consult Note    SUMMARY     Recommendations    Recommendation/Intervention: Pt diet recently advnaced.  Pt has a great appetite, consuming 100% of meals. Modify diabetic diet 1800 to diabetic 2200 to better meet pt estimated energy needs.  Continue cardiac restriction. Continue to encourage good PO intake.  RD following     Goals: Pt to consume >75% of meals.  Nutrition Goal Status: new  Communication of RD Recs: reviewed with RN      Reason for Assessment    Reason for Assessment: physician consult  Diagnosis: other (see comments) (Retinal artery branch occlusion of R eye)  Relevent Medical History: vision loss, HTN, DM2, HLD   General Information Comments: Nutrition discharge planning: Adequate PO intake    Nutrition Prescription Ordered    Current Diet Order: Diabetic 1800, cardiac (low Na/low chol)          Nutrition Risk Screen     Nutrition Risk Screen: no indicators present    Nutrition/Diet History    Patient Reported Diet/Restrictions/Preferences: diabetic diet  Typical Food/Fluid Intake: Pt last meal was BF yesterday AM, pt reports being hungry and ate 100% of meal  Factors Affecting Nutritional Intake:  (None at this time)       Labs/Tests/Procedures/Meds       Pertinent Labs Reviewed: reviewed, pertinent  Pertinent Labs Comments: glu-156, bilirubin-1.1, LDL-53.4  Pertinent Medications Reviewed: reviewed, pertinent  Pertinent Medications Comments: statin, senna docusate, insulin    Physical Findings    Overall Physical Appearance: obese (nourished)  Skin: intact    Anthropometrics       Height (inches): 67.01 in  Weight Method: Bed Scale  Weight (kg): 98.7 kg  Ideal Body Weight (IBW), Male: 148.06 lb     % Ideal Body Weight, Male (lb): 146.97 lb  BMI (kg/m2): 34.07  BMI Grade: 30 - 34.9- obesity - grade I  Usual Body Weight (UBW), k.5 kg  % Usual Body Weight: 94.45  % Weight Change: 5 kg (%)  Weight Loss: intentional        Estimated/Assessed  Needs    Weight Used For Calorie Calculations: 98.7 kg (217 lb 9.5 oz)   Height (cm): 170.2 cm     Energy Need Method: [place]-St Jeor (x 1.2 PAL= 2140 kcal/day)  RMR ([place]-St. Jeor Equation): 1784.16        Weight Used For Protein Calculations: 98.7 kg (217 lb 9.5 oz)  Protein Requirements: 98 g/day  Fluid Need Method: RDA Method (or per MD)         Malnutrition (Undernutrition) Diagnosis    % Intake of Estimated Energy Needs: 75 - 100%  % Meal Intake: 100%          Nutrition Diagnosis    Nutrition Problem:  (None at this time)    Monitor and Evaluation    Food and Nutrient Intake: energy intake, food and beverage intake  Food and Nutrient Adminstration: diet order  Anthropometric Measurements: weight, weight change, body mass index  Biochemical Data, Medical Tests and Procedures: electrolyte and renal panel, gastrointestinal profile, glucose/endocrine profile  Nutrition-Focused Physical Findings: overall appearance    Nutrition Risk    Level of Risk: low    Nutrition Follow-Up    RD Follow-up?: Yes

## 2017-01-23 NOTE — PLAN OF CARE
Problem: Occupational Therapy Goal  Goal: Occupational Therapy Goal  Goals set 1/23 to be addressed for 7 days with expiration date, 1/30:  Patient will increase functional independence with ADLs by performing:  Patient will demonstrate independence with HEP for reading and visual scanning. Not met  Patients family / caregiver will demonstrate independence and safety with assisting patient with self-care skills and functional mobility. Not met  Patient and/or patients family will verbalize understanding of stroke prevention guidelines, personal risk factors and stroke warning signs via teachback method. Not met   OT evaluation initiated.  RENEE Campos  1/23/2017

## 2017-01-23 NOTE — ED NOTES
Assumed care of patient.  Rounding completed on patient. Plan of care discussed and patient has no complaints or questions. Pt is in bed awake and alert. Pt is resting comfortably and is in no acute distress. Respirations are even and unlabored. Pt denies chest pain or SOB. Pt has no complaints at this time. The bed is in low, locked position with side rails upx2. Call light is in reach, and patient is oriented to call light use. Pt states they will call nurse if they need assistance.

## 2017-01-23 NOTE — PT/OT/SLP EVAL
"Speech Language Pathology Evaluation/Discharge    Prince Corona   MRN: [id number]   Admitting Diagnosis: Retinal artery branch occlusion of right eye    Diet recommendations: Solid Diet Level: Regular  Liquid Diet Level: Thin General aspiration precautions    SLP Treatment Date: 17  Speech Start Time: 813     Speech Stop Time: 834     Speech Total (min): 21 min       TREATMENT BILLABLE MINUTES:  Eval 13  and Eval Swallow and Oral Function 8    Diagnosis: Retinal artery branch occlusion of right eye      Past Medical History   Diagnosis Date    Diabetes mellitus     Hypertension      History reviewed. No pertinent past surgical history.    Has the patient been evaluated by SLP for swallowing? : Yes  Keep patient NPO?: No   General Precautions: Standard,            Subjective:  "I haven't eaten in over 24 hours."    Pain Ratin/10                   Objective:        Oral Musculature Evaluation  Oral Musculature: WFL  Dentition: present and adequate  Mucosal Quality: good  Mandibular Strength and Mobility: WFL  Oral Labial Strength and Mobility: WFL  Lingual Strength and Mobility: WFL  Velar Elevation: WFL  Buccal Strength and Mobility: WFL  Volitional Cough: good  Volitional Swallow: elicited  Voice Prior to PO Intake: dry, clear     Cognitive Status:  Behavioral Observations: alert, appropriate and cooperative-  Memory and Orientation: O x 4. General recall, immediate recall, and delayed recall were 100%  Attention: good  Problem Solving: WFL; pt answered problem solving q's, compared/contrasted 2 items, and completed sequencing tasks with 100% acc.   Pragmatics: [facility]  Executive Function: [facility]    Language: [facility]    Auditory Comprehension: answers yes/no questions and able to follow commands complex    Verbal Expression: wfl; pt able to communicate basic and complex information. During a word fluency task, pt listed 24 items in a category in one minute (15-20 is wnl).    Motor Speech: [facility]    Voice: " WFL    Reading: oral reading ability and comprehension WFL; pt compensating for R visual loss ind'ly.    Writing: WFL; right visual loss not affecting writing abilities    Visual-Spatial: pt ind'ly compensating for right visual loss; able to complete visual spatial tasks with good ability.    Bedside Swallow Eval:  Consistencies Assessed: Thin liquids approx 4ox via cup and straw, Puree tsp x 2 and Solids cracker  Oral Phase: WNL  Pharyngeal Phase: no overt clinical  signs/symptoms of aspiration and no overt clinical signs/symptoms of pharyngeal dysphagia      FIM:  Social Interaction: 7 Complete [place]--The patient interacts appropriately with staff, other patients, and family members (e.g., controls temper, accepts criticism, is aware that words and actions have an impact on others), and does not require medication for   Problem Solvin Complete [place]--The patient consistently recognizes problems when present, makes appropriate decisions, initiates and carries out a sequence of steps to solve complex problems until the task is completed, and self-corrects if errors are made.   Comprehension: 7 Complete [place]--The patient understand complex or abstract directions and conversation, and understand either spoken or written language (not necessarily English).   Expression: 7 Complete [place]--The patient expresses complex or abstract ideas clearly and fluently (not necessarily in English).   Memory: 7 Complete [place]--The patient recognizes people frequently encountered, remebers daily routines, and executes requests of others without need for repetition.     Assessment:  Prince Corona is a 57 y.o. male  . Speech, language, cognition, and swallowing abilities found to be WNL. No further ST services warranted at this time.    Discharge recommendations: Discharge Facility/Level Of Care Needs:  (no further ST services warranted)     Goals:   SLP Goals     Not on file           Plan:    Patient to be seen     Plan of Care expires:    Plan of Care reviewed with: patient, spouse  SLP Follow-up?: No         SLP G-Codes  Functional Assessment Tool Used: noms  Score: 7  Functional Limitations: Swallowing  Swallow Current Status ():   Swallow Goal Status ():   Swallow Discharge Status ():     GENNA Steve, ERIKA-SLP  01/23/2017    GENNA Steve, CCC-SLP  Speech Language Pathologist  [phone]  1/23/2017

## 2017-01-23 NOTE — PT/OT/SLP EVAL
"Occupational Therapy  Evaluation    Prince Corona   MRN: [id number]   Admitting Diagnosis: Retinal artery branch occlusion of right eye    OT Date of Treatment: 17   OT Start Time: 0649  OT Stop Time: 0700  OT Total Time (min): 11 min    Billable Minutes:  Evaluation 11    Diagnosis: Retinal artery branch occlusion of right eye     Past Medical History   Diagnosis Date    Diabetes mellitus     Hypertension       History reviewed. No pertinent past surgical history.    Referring physician: Julien  Date referred to OT:   General Precautions: Standard, fall, aspiration, NPO  Orthopedic Precautions: N/A  Braces: N/A    Do you have any cultural, spiritual, Restorationism conflicts, given your current situation?: Gnosticist     Patient History:   Prior level of function:   Patient resides in [place], [place] with wife in one story home with 3 steps to enter, bilateral rail.  PTA patient independent with ADLs including driving.  Currently owns no DME.  Patient is right handed.  Hobbies:  fishing, building, chopping wood, reading.  Retired: [employer] law [employer].  Works part time: maintenance at [employer].     Subjective:  Communicated with nurse prior to session.  Patient:  "Yesterday morning, I had a sneezing spell while getting ready for [employer] and then I blew my nose and literally thought something was on my glasses.  My vision in my right eye completely went away."  Pain Ratin/10   Pain Rating Post-Intervention: 0/10    Objective:  Patient found with: blood pressure cuff, telemetry, peripheral IV  Wife present.    Cognitive Exam:  Oriented to: Person, Place, Time and Situation  Follows Commands/attention: Follows one-step commands  Communication: clear/fluent  Memory:  No Deficits noted  Safety awareness/insight to disability: intact  Coping skills/emotional control: Appropriate to situation    Visual/perceptual:  Intact    Physical Exam:  Postural examination/scapula alignment: Rounded " shoulder  Skin integrity: Visible skin intact  Edema: None noted     Sensation:   Intact    Upper Extremity Range of Motion:  Right Upper Extremity: WNL  Left Upper Extremity: WNL    Upper Extremity Strength:  Right Upper Extremity: WNL  Left Upper Extremity: WNL    Fine motor coordination:   Intact    Gross motor coordination: WFL    Functional Mobility:  Bed Mobility:  Rolling/Turning to Left: Independent  Rolling/Turning Right: Independent  Scooting/Bridging: Independent  Supine to Sit: Independent  Sit to Supine: Independent    Transfers:  Sit <> Stand Assistance: Modified Independent  Sit <> Stand Assistive Device: No Assistive Device  Bed <> Chair Transfer Assistance: Modified Independent  Bed <> Chair Assistive Device: No Assistive Device    Activities of Daily Living:  Feeding Level of Assistance: Activity did not occur  UE Dressing Level of Assistance: Modified independent  LE Dressing Level of Assistance: Modified independent  Grooming Position: Standing  Grooming Level of Assistance: Modified independent    Additional Treatment:   Patient/ Family education provided for stroke warning signs, prevention guidelines and personal risk factors.  Patient/ Family verbalizing understanding via teach back method. Patient education provided on role of OT.   Patient verbalizing understanding via teach back method.  Patient alert and oriented x 3; able to follow 4/4 one step commands.  Patient attentive and interactive throughout the session.  Patient able to identify 5/5 body parts.  Able to name 5/5 objects.  Able to sequence 7/7 days of the week and 12/12 months of the year.   White board updated in patient's room.  OT asked if there were any other questions; patient/ family had no further questions.      AM-PAC 6 CLICK ADL  How much help from another person does this patient currently need?  1 = Unable, Total/Dependent Assistance  2 = A lot, Maximum/Moderate Assistance  3 = A little, Minimum/Contact  "Guard/Supervision  4 = None, Modified [place]/Independent    Putting on and taking off regular lower body clothing? : 4  Bathing (including washing, rinsing, drying)?: 4  Toileting, which includes using toilet, bedpan, or urinal? : 4  Putting on and taking off regular upper body clothing?: 4  Taking care of personal grooming such as brushing teeth?: 4  Eating meals?: 4  Total Score: 24    AM-PAC Raw Score CMS "G-Code Modifier Level of Impairment Assistance   6 % Total / Unable   7 - 9 CM 80 - 100% Maximal Assist   10 - 14 CL 60 - 80% Moderate Assist   15 - 19 CK 40 - 60% Moderate Assist   20 - 22 CJ 20 - 40% Minimal Assist   23 CI 1-20% SBA / CGA   24 CH 0% Independent/ Mod I       Patient left supine with all lines intact and call button in reach    Assessment:  Prince Corona is a 57 y.o. male with a medical diagnosis of Retinal artery branch occlusion of right eye and presents with Patient demonstrating performance deficits of physical skills including impaired vision which are skills necessary to successfully and appropriately participate in everyday tasks and social situations.  This performance deficits have resulted in activity limitations including but not limited to:  Driving, reading, maintenance work, balancing checkbook, shopping, meal preparation, community roles.   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 Patient will benefit from skilled OT services to maximize level of independence with self-care skills and functional mobility.      Rehab identified problem list/impairments: Rehab identified problem list/impairments: visual deficits    Rehab potential is good.    Activity tolerance: Good    Discharge recommendations: Discharge Facility/Level Of Care Needs: home     Barriers to discharge: Barriers to Discharge: None    Equipment recommendations: none     GOALS:   Occupational Therapy Goals        Problem: Occupational Therapy Goal    " Goal Priority Disciplines Outcome Interventions   Occupational Therapy Goal     OT, PT/OT     Description:  Goals set 1/23 to be addressed for 7 days with expiration date, 1/30:  Patient will increase functional independence with ADLs by performing:  Patient will demonstrate independence with HEP for reading and visual scanning.    Not met  Patient's family / caregiver will demonstrate independence and safety with assisting patient with self-care skills and functional mobility.     Not met  Patient and/or patient's family will verbalize understanding of stroke prevention guidelines, personal risk factors and stroke warning signs via teachback method.  Not met                     PLAN:  Patient to be seen 3 x/week to address the above listed problems via self-care/home management, cognitive retraining  Plan of Care expires: 02/20/17  Plan of Care reviewed with: patient, spouse         RENEE Perez  01/23/2017

## 2017-01-23 NOTE — PROGRESS NOTES
History & Physical  Neurocritical Care    Admit Date: 1/22/2017  LOS: 1    Code Status: Full Code     CC: Retinal artery branch occlusion of right eye    SUBJECTIVE:     Interval Hx- Patient denies any acute events overnight. Peripheral vision improving in R eye with bright colors, although still cannot make out faces and sees outlines. Will obtain 24 hr post tPA imaging with MRI.      Step down to VN once MRI Brain results.      History of Present Illness:   Prince Corona is a 57 y.o. male w/ a significant PMHx of HTN, T2DM, HLD who presents to [facility] ED from [facility] S/P tPA for right eye retinal artery occlusion. Patient states he experienced a sneezing fit, after which he blew his nose and then noticed a change in his vision. Originally thought to have something in his eye but did not see anything in the mirror. This occurred at approximately 0800 this morning 01/22/17. Patient denies any pain associated w/ the loss of vision. Patient states it started out initially as a dark spot in the center of his vision that progressed to involve the entire eye. Patient denies any other associated symptoms such as dizziness, speech difficulty, numbness, loss of sensation or weakness in any extremities.     After symptoms failed to improve patient went to OSH where a CTA head/neck were performed which was negative for acute infarct of large vessel occlusion. Patient then received tPA at approximately 1100. After getting tPA patient states his vision improved slightly in his periphery. On arrival at [facility] patient was seen by NSGY, Stroke, and Ophthalmology. Patient will not be going for angio and patient does not appear to have an active clot or lesion to target. Patient denies history of previous CVA/TIA, jaw claudication, or right sided facial/temporal pain, denies eye pain, or Hx of arrhthymias.       Past Medical History   Diagnosis Date    Diabetes mellitus     Hypertension      History reviewed. No pertinent  past surgical history.  No current facility-administered medications on file prior to encounter.      No current outpatient prescriptions on file prior to encounter.     Review of patient's allergies indicates:   Allergen Reactions    Penicillins Other (See Comments)     unknown     History reviewed. No pertinent family history.  Social History   Substance Use Topics    Smoking status: Light Tobacco Smoker    Smokeless tobacco: None    Alcohol use Yes        Review of Symptoms:  Constitutional: Denies fevers, weight loss, chills, or weakness.  Eyes: Denies changes in vision.  ENT: Denies dysphagia, nasal discharge, ear pain or discharge.  Cardiovascular: Denies chest pain, palpitations, orthopnea, or claudication.  Respiratory: Denies shortness of breath, cough, hemoptysis, or wheezing.  GI: Denies nausea/vomitting, hematochezia, melena, abd pain, or changes in appetite.  : Denies dysuria, incontinence, or hematuria.  Musculoskeletal: Denies joint pain or myalgias.  Skin/breast: Denies rashes, lumps, lesions, or discharge.  Neurologic: Endorses loss of vision in right eye and loss of depth perception, denies headache, dizziness, vertigo, or paresthesias.  Psychiatric: Denies changes in mood or hallucinations.  Endocrine: Denies polyuria, polydipsia, heat/cold intolerance.  Hematologic/Lymph: Denies lymphadenopathy, easy bruising or easy bleeding.  Allergic/Immunologic: Denies rash, rhinitis.     OBJECTIVE:   Vital Signs (Most Recent):   Temp: 98.6 °F (37 °C) (01/23/17 1500)  Pulse: 84 (01/23/17 1500)  Resp: (!) 27 (01/23/17 1500)  BP: 134/83 (01/23/17 1500)  SpO2: 98 % (01/23/17 1500)    Vital Signs (24h Range):   Temp:  [98 °F (36.7 °C)-98.6 °F (37 °C)] 98.6 °F (37 °C)  Pulse:  [] 84  Resp:  [11-36] 27  SpO2:  [94 %-98 %] 98 %  BP: (123-180)/() 134/83    I & O (Last 24h):    Intake/Output Summary (Last 24 hours) at 01/23/17 1521  Last data filed at 01/23/17 1500   Gross per 24 hour   Intake              1300 ml   Output             1851 ml   Net             -551 ml       Physical Exam:  GA: Alert, comfortable, no acute distress.   HEENT: No scleral icterus or JVD.   Pulmonary: Clear to auscultation A/P/L. No wheezing, crackles, or rhonchi.  Cardiac: RRR S1 & S2 w/o rubs/murmurs/gallops.   Abdominal: Bowel sounds present x 4. No appreciable hepatosplenomegaly.  Skin: No jaundice, rashes, or visible lesions.  Neuro:  --GCS: E4 V5 M6  --Mental Status: alert and oriented x3  --CN II-XII grossly intact.   --Pupils 4 mm, right eye fixed and dilated S/P dilation drops in ED, loss of visual acuity of right eye, loss of peripheral fields evident on confrontation test  --Corneal reflex, gag, cough intact.  --LUE strength: 5/5  --RUE strength: 5/5  --LLE strength: 5/5  --RLE strength: 5/5    Lines/Drains/Airway:             Nutrition/Tube Feeds:   Current Diet Order   Procedures    Diet Cardiac Diabetic 1800, Cardiac (Low Na/Chol)     Order Specific Question:   Additional restrictions:     Answer:   Diabetic 1800     Order Specific Question:   Additional restrictions:     Answer:   Cardiac (Low Na/Chol)       Labs:  BMP:    Recent Labs  Lab 01/23/17  0341      K 3.5      CO2 25   BUN 13   CREATININE 0.8   *   MG 1.8   PHOS 3.7     LFT:   Lab Results   Component Value Date    AST 29 01/23/2017    ALT 38 01/23/2017    ALKPHOS 66 01/23/2017    BILITOT 1.1 (H) 01/23/2017    ALBUMIN 3.7 01/23/2017    PROT 6.5 01/23/2017     CBC:   Lab Results   Component Value Date    WBC 5.14 01/23/2017    HGB 15.2 01/23/2017    HCT 42.0 01/23/2017    MCV 89 01/23/2017     01/23/2017     Microbiology x 7d:   Microbiology Results (last 7 days)     ** No results found for the last 168 hours. **        Imaging:    CTA head/neck done at OSH. Will obtain records.    ASSESSMENT/PLAN:     Patient Active Problem List    Diagnosis Date Noted    Vision loss of right eye 01/22/2017    Diabetes mellitus type II, controlled  01/22/2017    Essential hypertension 01/22/2017    Mixed hyperlipidemia 01/22/2017    Retinal artery branch occlusion of right eye 01/22/2017       Neuro:   - Symptoms of vision loss occurred around 0800 on 01/22/17  - S/P tPA at OSH at approximately 1100  - Q1H neuro checks  - Goal SBP < 180 mmHg as patient received tPA   - Will obtain 2D ECHO, carotid U/S, U/S of extremities (Hx of DVT)- normal  - Will obtain MRI/MRA Brain neck once cleared by radiology for ACDF implants- X ray obtained as per radiology 24 h post tPA  -- Start ASA and SQH once post tPA imaging ruled out bleed.  -- Step down to vascular neurology following MRI    Pulmonary:   - No acute issues at this time  - Oxygenation well on room air    Cardiac:   - S/P tPA, goal SBP < 180 mmHg-   - Hx of HTN, on Norvasc 10 mg daily, Lisinopril 20 mg daily  - Atorvastatin 40 mg PO daily  - Pulse:  [] 100  - BP: (130-146)/() 145/93  - 2D ECHO w/ bubble study ordered- concentric remodeling    Renal:    - BUN/Cr: 13//0.8    ID:   - Most recent temp of 98.6 F  - WBC 5  - No signs of acute infection    Hem/Onc:   - H/H: 15/42  - Platelets of 177  - No signs of active hemorrhage    Endocrine:    - Hx of T2DM  - Last HbA1c was 5.9  - Moderate dose SSI   - On Metformin 850 mg PO BID at home    Fluids/Electrolytes/Nutrition/GI:   - IVF NS @ 75 mL/hr while NPO- diet started will d/c IVF  - Will replace electrolytes PRN  - NPO at this time    Lines: PIVs LAC, RAC    PPx:  DVT: SCDs, S/P tPA  Bowel: senna-docusate  PUP: not indicated  VAP: not indicated        Lo Javier MD  Neurology Resident   [facility] [address] [place] [address]  Pager: [phone]

## 2017-01-23 NOTE — PLAN OF CARE
Problem: Patient Care Overview  Goal: Plan of Care Review  Outcome: Ongoing (interventions implemented as appropriate)  POC reviewed with pt at 0500. Pt verbalized understanding. Questions and concerns addressed. No acute events overnight. Pt progressing toward goals. Will continue to monitor. See flowsheets for full assessment and VS info

## 2017-01-24 VITALS
HEART RATE: 111 BPM | OXYGEN SATURATION: 97 % | WEIGHT: 217.63 LBS | RESPIRATION RATE: 16 BRPM | SYSTOLIC BLOOD PRESSURE: 131 MMHG | DIASTOLIC BLOOD PRESSURE: 95 MMHG | BODY MASS INDEX: 34.16 KG/M2 | HEIGHT: 67 IN | TEMPERATURE: 98 F

## 2017-01-24 LAB
ALBUMIN SERPL BCP-MCNC: 3.7 G/DL
ALBUMIN SERPL BCP-MCNC: 3.8 G/DL
ALP SERPL-CCNC: 71 U/L
ALP SERPL-CCNC: 73 U/L
ALT SERPL W/O P-5'-P-CCNC: 34 U/L
ALT SERPL W/O P-5'-P-CCNC: 35 U/L
ANION GAP SERPL CALC-SCNC: 8 MMOL/L
ANION GAP SERPL CALC-SCNC: 9 MMOL/L
ANISOCYTOSIS BLD QL SMEAR: SLIGHT
AST SERPL-CCNC: 23 U/L
AST SERPL-CCNC: 23 U/L
BASOPHILS # BLD AUTO: 0.01 K/UL
BASOPHILS # BLD AUTO: 0.02 K/UL
BASOPHILS NFR BLD: 0.1 %
BASOPHILS NFR BLD: 0.3 %
BILIRUB SERPL-MCNC: 0.7 MG/DL
BILIRUB SERPL-MCNC: 0.7 MG/DL
BUN SERPL-MCNC: 11 MG/DL
BUN SERPL-MCNC: 12 MG/DL
CALCIUM SERPL-MCNC: 8.5 MG/DL
CALCIUM SERPL-MCNC: 8.7 MG/DL
CHLORIDE SERPL-SCNC: 106 MMOL/L
CHLORIDE SERPL-SCNC: 106 MMOL/L
CO2 SERPL-SCNC: 23 MMOL/L
CO2 SERPL-SCNC: 25 MMOL/L
CREAT SERPL-MCNC: 0.8 MG/DL
CREAT SERPL-MCNC: 0.8 MG/DL
DIFFERENTIAL METHOD: ABNORMAL
DIFFERENTIAL METHOD: ABNORMAL
EOSINOPHIL # BLD AUTO: 0.2 K/UL
EOSINOPHIL # BLD AUTO: 0.2 K/UL
EOSINOPHIL NFR BLD: 2.3 %
EOSINOPHIL NFR BLD: 2.7 %
ERYTHROCYTE [DISTWIDTH] IN BLOOD BY AUTOMATED COUNT: 11.9 %
ERYTHROCYTE [DISTWIDTH] IN BLOOD BY AUTOMATED COUNT: 12.2 %
EST. GFR  (AFRICAN AMERICAN): >60 ML/MIN/1.73 M^2
EST. GFR  (AFRICAN AMERICAN): >60 ML/MIN/1.73 M^2
EST. GFR  (NON AFRICAN AMERICAN): >60 ML/MIN/1.73 M^2
EST. GFR  (NON AFRICAN AMERICAN): >60 ML/MIN/1.73 M^2
GLUCOSE SERPL-MCNC: 156 MG/DL
GLUCOSE SERPL-MCNC: 164 MG/DL
HCT VFR BLD AUTO: 41.3 %
HCT VFR BLD AUTO: 42.4 %
HGB BLD-MCNC: 15.1 G/DL
HGB BLD-MCNC: 15.3 G/DL
HYPOCHROMIA BLD QL SMEAR: ABNORMAL
LYMPHOCYTES # BLD AUTO: 1 K/UL
LYMPHOCYTES # BLD AUTO: 1 K/UL
LYMPHOCYTES NFR BLD: 13.1 %
LYMPHOCYTES NFR BLD: 16.4 %
MAGNESIUM SERPL-MCNC: 1.9 MG/DL
MAGNESIUM SERPL-MCNC: 2.2 MG/DL
MCH RBC QN AUTO: 31.9 PG
MCH RBC QN AUTO: 32.2 PG
MCHC RBC AUTO-ENTMCNC: 36.1 %
MCHC RBC AUTO-ENTMCNC: 36.6 %
MCV RBC AUTO: 87 FL
MCV RBC AUTO: 89 FL
MONOCYTES # BLD AUTO: 0.7 K/UL
MONOCYTES # BLD AUTO: 0.8 K/UL
MONOCYTES NFR BLD: 10.3 %
MONOCYTES NFR BLD: 11.9 %
NEUTROPHILS # BLD AUTO: 4.1 K/UL
NEUTROPHILS # BLD AUTO: 5.4 K/UL
NEUTROPHILS NFR BLD: 68.4 %
NEUTROPHILS NFR BLD: 73.9 %
OVALOCYTES BLD QL SMEAR: ABNORMAL
PHOSPHATE SERPL-MCNC: 3.7 MG/DL
PHOSPHATE SERPL-MCNC: 3.8 MG/DL
PLATELET # BLD AUTO: 169 K/UL
PLATELET # BLD AUTO: 182 K/UL
PMV BLD AUTO: 9.1 FL
PMV BLD AUTO: 9.2 FL
POCT GLUCOSE: 148 MG/DL (ref 70–110)
POCT GLUCOSE: 171 MG/DL (ref 70–110)
POIKILOCYTOSIS BLD QL SMEAR: SLIGHT
POLYCHROMASIA BLD QL SMEAR: ABNORMAL
POTASSIUM SERPL-SCNC: 3.6 MMOL/L
POTASSIUM SERPL-SCNC: 4 MMOL/L
PROT SERPL-MCNC: 6.5 G/DL
PROT SERPL-MCNC: 6.6 G/DL
RBC # BLD AUTO: 4.74 M/UL
RBC # BLD AUTO: 4.75 M/UL
SODIUM SERPL-SCNC: 138 MMOL/L
SODIUM SERPL-SCNC: 139 MMOL/L
WBC # BLD AUTO: 6.03 K/UL
WBC # BLD AUTO: 7.31 K/UL

## 2017-01-24 PROCEDURE — 99239 HOSP IP/OBS DSCHRG MGMT >30: CPT | Mod: ,,, | Performed by: PSYCHIATRY & NEUROLOGY

## 2017-01-24 PROCEDURE — 84100 ASSAY OF PHOSPHORUS: CPT

## 2017-01-24 PROCEDURE — 25000003 PHARM REV CODE 250: Performed by: PSYCHIATRY & NEUROLOGY

## 2017-01-24 PROCEDURE — 63600175 PHARM REV CODE 636 W HCPCS: Performed by: NURSE PRACTITIONER

## 2017-01-24 PROCEDURE — 83735 ASSAY OF MAGNESIUM: CPT | Mod: 91

## 2017-01-24 PROCEDURE — 80053 COMPREHEN METABOLIC PANEL: CPT

## 2017-01-24 PROCEDURE — 83735 ASSAY OF MAGNESIUM: CPT

## 2017-01-24 PROCEDURE — 3E0234Z INTRODUCTION OF SERUM, TOXOID AND VACCINE INTO MUSCLE, PERCUTANEOUS APPROACH: ICD-10-PCS | Performed by: NURSE PRACTITIONER

## 2017-01-24 PROCEDURE — 80053 COMPREHEN METABOLIC PANEL: CPT | Mod: 91

## 2017-01-24 PROCEDURE — 90471 IMMUNIZATION ADMIN: CPT | Performed by: NURSE PRACTITIONER

## 2017-01-24 PROCEDURE — 97530 THERAPEUTIC ACTIVITIES: CPT

## 2017-01-24 PROCEDURE — 36415 COLL VENOUS BLD VENIPUNCTURE: CPT

## 2017-01-24 PROCEDURE — 85025 COMPLETE CBC W/AUTO DIFF WBC: CPT

## 2017-01-24 PROCEDURE — 25000003 PHARM REV CODE 250: Performed by: STUDENT IN AN ORGANIZED HEALTH CARE EDUCATION/TRAINING PROGRAM

## 2017-01-24 PROCEDURE — 84100 ASSAY OF PHOSPHORUS: CPT | Mod: 91

## 2017-01-24 PROCEDURE — 90686 IIV4 VACC NO PRSV 0.5 ML IM: CPT | Performed by: NURSE PRACTITIONER

## 2017-01-24 PROCEDURE — 97532 *HC OT COG SKL DEV EA 15: CPT

## 2017-01-24 RX ORDER — ASPIRIN 325 MG
325 TABLET ORAL DAILY
Refills: 0
Start: 2017-01-24 | End: 2018-01-24

## 2017-01-24 RX ORDER — ATORVASTATIN CALCIUM 40 MG/1
20 TABLET, FILM COATED ORAL DAILY
Qty: 60 TABLET | Refills: 0 | Status: SHIPPED | OUTPATIENT
Start: 2017-01-24 | End: 2018-01-24

## 2017-01-24 RX ADMIN — ASPIRIN 81 MG: 81 TABLET, COATED ORAL at 09:01

## 2017-01-24 RX ADMIN — LISINOPRIL 20 MG: 20 TABLET ORAL at 09:01

## 2017-01-24 RX ADMIN — Medication 3 ML: at 05:01

## 2017-01-24 RX ADMIN — HEPARIN SODIUM 5000 UNITS: 5000 INJECTION, SOLUTION INTRAVENOUS; SUBCUTANEOUS at 05:01

## 2017-01-24 RX ADMIN — INFLUENZA A VIRUS A/CALIFORNIA/7/2009 X-179A (H1N1) ANTIGEN (FORMALDEHYDE INACTIVATED), INFLUENZA A VIRUS A/HONG KONG/4801/2014 X-263B (H3N2) ANTIGEN (FORMALDEHYDE INACTIVATED), INFLUENZA B VIRUS B/PHUKET/3073/2013 ANTIGEN (FORMALDEHYDE INACTIVATED), AND INFLUENZA B VIRUS B/BRISBANE/60/2008 ANTIGEN (FORMALDEHYDE INACTIVATED) 0.5 ML: 15; 15; 15; 15 INJECTION, SUSPENSION INTRAMUSCULAR at 11:01

## 2017-01-24 RX ADMIN — AMLODIPINE BESYLATE 10 MG: 10 TABLET ORAL at 09:01

## 2017-01-24 RX ADMIN — ATORVASTATIN CALCIUM 40 MG: 20 TABLET, FILM COATED ORAL at 09:01

## 2017-01-24 NOTE — PLAN OF CARE
Problem: Patient Care Overview  Goal: Plan of Care Review  Outcome: Ongoing (interventions implemented as appropriate)  Pt is AAOX4. He denies Chest pain, SOB or nausea. R vision lost. No falls, trauma or injury noted. VSS. Plan of care reviewed with patient. No further questions at this time. No significant events. Will continue to monitor.

## 2017-01-24 NOTE — NURSING TRANSFER
Nursing Transfer Note      1/24/2017     Transfer From: ICU to Room 728 B    Transfer via wheelchair    Transfer with cardiac monitoring    Transported by RN    Medicines sent: No    Chart send with patient: Yes    Notified: family    Patient reassessed at: 1/24/2017 0400    Upon arrival to floor: cardiac monitor applied, patient oriented to room, call bell in reach and bed in lowest position

## 2017-01-24 NOTE — PLAN OF CARE
Problem: Occupational Therapy Goal  Goal: Occupational Therapy Goal  Goals set 1/23 to be addressed for 7 days with expiration date, 1/30:  Patient will increase functional independence with ADLs by performing:  Patient will demonstrate independence with HEP for reading and visual scanning. Not met  Patients family / caregiver will demonstrate independence and safety with assisting patient with self-care skills and functional mobility. Not met  Patient and/or patients family will verbalize understanding of stroke prevention guidelines, personal risk factors and stroke warning signs via teachback method. Not met        Goals achieved; no further OT needed.  RENEE Campos  1/24/2017

## 2017-01-24 NOTE — PROGRESS NOTES
[facility]  Vascular Neurology  Comprehensive Stroke Center  Progress Note      Neurologic Chief Complaint:Right Eye Vision Loss    Subjective:     Interval History: Patient is seen for follow-up neurological assessment and treatment recommendations: Found to have CRAO.      HPI, Past Medical, Family, and Social History remains the same as documented in the initial encounter.     Review of Systems   Constitutional: Negative for chills and fever.   Respiratory: Negative for cough and shortness of breath.    Gastrointestinal: Negative for diarrhea and vomiting.     Scheduled Meds:   amlodipine  10 mg Oral Daily    [START ON 1/24/2017] aspirin  81 mg Oral Daily    atorvastatin  40 mg Oral Daily    [START ON 1/24/2017] heparin (porcine)  5,000 Units Subcutaneous Q8H    lisinopril  20 mg Oral Daily    senna-docusate 8.6-50 mg  1 tablet Oral BID    sodium chloride 0.9%  3 mL Intravenous Q8H     Continuous Infusions:   sodium chloride 0.9% 75 mL/hr at 01/23/17 0600     PRN Meds:dextrose 50%, dextrose 50%, gadobutrol, glucagon (human recombinant), glucose, glucose, hydrALAZINE, [START ON 1/24/2017] flu vac [id number] 36mos up(PF), insulin aspart, labetalol, ondansetron, [START ON 1/24/2017] pneumoc 13-kumar conj-dip cr(PF)    Objective:     Vital Signs (Most Recent):  Temp: 98.6 °F (37 °C) (01/23/17 1500)  Pulse: 84 (01/23/17 1800)  Resp: (!) 26 (01/23/17 1800)  BP: (!) 165/100 (01/23/17 1800)  SpO2: 98 % (01/23/17 1800)  BP Location: Right arm    Vital Signs Range (Last 24H):  Temp:  [98 °F (36.7 °C)-98.6 °F (37 °C)]   Pulse:  []   Resp:  [11-36]   BP: (123-180)/()   SpO2:  [94 %-98 %]   BP Location: Right arm    Physical Exam   Constitutional: He appears well-developed and well-nourished.   HENT:   Head: Normocephalic and atraumatic.   Eyes: Pupils are equal, round, and reactive to light.   Pulmonary/Chest: Effort normal. No respiratory distress.       Neurological Exam:   LOC: alert  and follows requests  Language: No aphasia  Speech: No dysarthria  Orientation: Person, Place, Time  Memory: Recent memory intact, Remote memory intact, Age correct, Month correct  Visual Fields (CN II): Hemianopsia  right  EOM (CN III, IV, VI): Full/intact  Oculocephalics: not examined   Pupils (CN III, IV, VI): PERRL  Facial Sensation (CN V): Symmetric  Facial Movement (CN VII): symmetric facial expression  Motor*: Arm Left:  Normal (5/5), Leg Left:   Normal (5/5), Arm Right:   Normal (5/5), Leg Right:   Normal (5/5)  Sensation: intact to light touch, temperature and vibration  Tone: Arm-Left: normal; Leg-Left: normal; Arm-Right: normal; Leg-Right: normal    NIH Stroke Scale:    Level of Consciousness: 0 - alert  LOC Questions: 0 - answers both correctly  LOC Commands: 0 - performs both correctly  Best Gaze: 0 - normal  Visual: 2 - complete hemianopia  Facial Palsy: 0 - normal  Motor Left Arm: 0 - no drift  Motor Right Arm: 0 - no drift  Motor Left Le - no drift  Motor Right Le - no drift  Limb Ataxia: 0 - absent  Sensory: 0 - normal  Best Language: 0 - no aphasia  Dysarthria: 0 - normal articulation  Extinction and Inattention: 0 - no neglect  NIH Stroke Scale Total: 2      Laboratory:  CMP:   Recent Labs  Lab 17  0341   CALCIUM 8.7   ALBUMIN 3.7   PROT 6.5      K 3.5   CO2 25      BUN 13   CREATININE 0.8   ALKPHOS 66   ALT 38   AST 29   BILITOT 1.1*     CBC:   Recent Labs  Lab 17  0341   WBC 5.14   RBC 4.73   HGB 15.2   HCT 42.0      MCV 89   MCH 32.1*   MCHC 36.2*     Coagulation:   Recent Labs  Lab 17  1445   INR 1.0     TSH:   Recent Labs  Lab 17  1445   TSH 2.100       Diagnostic Results:  MRI Brain 17  -no acute intracranial abnormality    MRA head/neck 17  No significant stenosis compromising cerebral blood flow     Carotid u/s 17  No stenosis bilaterally     Echo 17  -no LAE  -EF 55%      Assessment/Plan:     Mr. Corona is a  "56yo male with diabetes, hypertension and hyperlipidemia who presents with sudden right eye vision loss. Acute onset 8:05 this morning.  He denies other speech changes, no weakness, or sensory changes. CT head & CTA H/N at outside facility without acute infarct or large vessel occlusion. Patient received IV tPA and then transferred [place] [facility] [place]. Upon arrival patient reports his R eye vision is slightly improved to where he can see vague outlines of objects but everything is "dark and purple." Neurosurgery and ophthalmology consulted STAT. Awaiting for decision if patient will go to angio.   Patient reports he takes a lipitor 10mg, blood pressure medications, metformin, and ASA 81mg at home.     01/23/17 Awaiting complete stroke work up.  Still having difficulty with vision in right eye      * Retinal artery branch occlusion of right eye  See vision loss of right eye      Vision loss of right eye  Mr. Corona is a 56yo male who presents with sudden onset of R eye vision loss after sneezing. S/p IV tPA. This is likely an embolic event. No atherosclerosis present on imaging of carotid artery.  No LAE but could be cardioembolic.      -Antithrombotics for secondary stroke prevention: None: Reason: continue asa 325mg qd  -Statins for secondary stroke prevention and hyperlipidemia, if present: continue atorvastatin 10mg qhs  home dose LDL 53.4   -Aggressive risk factor modification: Hypertension, Diabetes, High Cholesterol, Diet and Exercise  - Rehab Efforts: Physical Therapy, Occupational Therapy and Speech and Language Pathology,   -Diagnostics: Ordered/Pending HgbA1C to assess blood glucose levels, Lipid Profile to assess cholesterol levels  -Neurosurgery and ophthalmology consulted   -VTE Prophylaxis: None: Reason for No Pharmacological VTE Prophylaxis: heparin 5000units q 8hours  -BP Parameters: SBP < 160  - Nursing Orders: Neuro checks- q1hour, Antiembolic stockings, Swallowing evaluation by Nursing, " Seizure precautions, Out of bed BID, Avoid Arroyo catheter, Pneumatic compression device, Stroke Education, Blood glucose target 100-130, Up with assistance    Disposition: home without therapy, needs 30 day event monitor   Follow up: opthalmology in 2 weeks for neovascularization         Diabetes mellitus type II, controlled  -Patient reports well controlled (last A1C 5.5 as OP)  -SS insulin as IP   -Stroke risk factor  hgA1C pending     Essential hypertension  -Stroke risk factor  -BP goal currently <160    Mixed hyperlipidemia  -continue Atorvastatin 10mg at home  -LDL 53      Beth Nunez PA-C  Comprehensive Stroke Center  Department of Vascular Neurology   [facility]

## 2017-01-24 NOTE — ASSESSMENT & PLAN NOTE
-Patient reports well controlled (last A1C 5.5 as OP)  -SS insulin as IP   -Stroke risk factor  hgA1C pending

## 2017-01-24 NOTE — PT/OT/SLP PROGRESS
"Occupational Therapy  Treatment /Discharge Summary    Prince Corona   MRN: [id number]   Admitting Diagnosis: Retinal artery branch occlusion of right eye    OT Date of Treatment: 17   OT Start Time: 830  OT Stop Time: 856  OT Total Time (min): 26 min    Billable Minutes:  Therapeutic Activity 10 and Cognitive Retraining 16    General Precautions: Standard, fall, aspiration, NPO  Orthopedic Precautions: N/A  Braces: N/A    Do you have any cultural, spiritual, Yazidi conflicts, given your current situation?: Judaism    Subjective:  Communicated with nurse prior to session.  Patient:  "I am doing better today."  Pain Ratin/10   Pain Rating Post-Intervention: 0/10    Objective:  Patient found with: peripheral IV  Wife present.     Functional Mobility:  Bed Mobility:  Rolling/Turning to Left: Independent  Rolling/Turning Right: Independent  Scooting/Bridging: Independent  Supine to Sit: Independent  Sit to Supine: Independent    Transfers:   Sit <> Stand Assistance: Independent  Sit <> Stand Assistive Device: No Assistive Device  Bed <> Chair Technique: Stand Pivot  Bed <> Chair Transfer Assistance: Independent    Activities of Daily Living:  Feeding Level of Assistance: Independent  UE Dressing Level of Assistance: Independent  LE Dressing Level of Assistance: Independent  Grooming Position: Standing  Grooming Level of Assistance: Independent  Toileting Where Assessed: Toilet  Toileting Level of Assistance: Independent    Additional Treatment:   Patient/ Family education provided for stroke warning signs, prevention guidelines and personal risk factors. Patient/ Family verbalizing understanding via teach back method. Patient education provided on role of OT. Patient verbalizing understanding via teach back method. Patient alert and oriented x 3; able to follow 4/4 one step commands. Patient attentive and interactive throughout the session. Patient able to identify 5/5 body parts. Able to name 5/5 objects. " Able to sequence 7/7 days of the week and 12/12 months of the year.  White board updated in patient's room. OT asked if there were any other questions; patient/ family had no further questions.  Able to perform clock design demonstrating accurate spatial relations.  Able to identify 60/60 items.  Able to read 3 paragraphs with accuracy and speed.    AM-PAC 6 CLICK ADL   How much help from another person does this patient currently need?   1 = Unable, Total/Dependent Assistance  2 = A lot, Maximum/Moderate Assistance  3 = A little, Minimum/Contact Guard/Supervision  4 = None, Modified [place]/Independent    Putting on and taking off regular lower body clothing? : 4  Bathing (including washing, rinsing, drying)?: 4  Toileting, which includes using toilet, bedpan, or urinal? : 4  Putting on and taking off regular upper body clothing?: 4  Taking care of personal grooming such as brushing teeth?: 4  Eating meals?: 4  Total Score: 24     AM-PAC Raw Score CMS G-Code Modifier Level of Impairment Assistance   6 % Total / Unable   7 - 9 CM 80 - 100% Maximal Assist   10 - 14 CL 60 - 80% Moderate Assist   15 - 19 CK 40 - 60% Moderate Assist   20 - 22 CJ 20 - 40% Minimal Assist   23 CI 1-20% SBA / CGA   24 CH 0% Independent/ Mod I     Patient left up in chair with all lines intact    ASSESSMENT:  Prince Corona is a 57 y.o. male with a medical diagnosis of Retinal artery branch occlusion of right eye and presents with no further functional deficits.    Rehab identified problem list/impairments: Rehab identified problem list/impairments: visual deficits    Rehab potential is good.    Activity tolerance: Good    Discharge recommendations: Discharge Facility/Level Of Care Needs: home     Barriers to discharge: Barriers to Discharge: None    Equipment recommendations: none     GOALS:   Occupational Therapy Goals     Not on file      Multidisciplinary Problems (Resolved)        Problem: Occupational Therapy Goal    Goal  Priority Disciplines Outcome Interventions   Occupational Therapy Goal   (Resolved)     OT, PT/OT Outcome(s) achieved    Description:  Goals set 1/23 to be addressed for 7 days with expiration date, 1/30:  Patient will increase functional independence with ADLs by performing:  Patient will demonstrate independence with HEP for reading and visual scanning.     met  Patient's family / caregiver will demonstrate independence and safety with assisting patient with self-care skills and functional mobility.      met  Patient and/or patient's family will verbalize understanding of stroke prevention guidelines, personal risk factors and stroke warning signs via teachback method.  Not met                      Plan:Discharge from OT services on acute.  Plan of Care reviewed with: patient, spouse         Deirdre RENEE García  01/24/2017

## 2017-01-24 NOTE — ASSESSMENT & PLAN NOTE
Mr. Corona is a 58yo male who presents with sudden onset of R eye vision loss after sneezing. S/p IV tPA. This is likely an embolic event. No atherosclerosis present on imaging of carotid artery.  No LAE but could be cardioembolic.      -Antithrombotics for secondary stroke prevention: None: Reason: continue asa 325mg qd  -Statins for secondary stroke prevention and hyperlipidemia, if present: continue atorvastatin 10mg qhs  home dose LDL 53.4   -Aggressive risk factor modification: Hypertension, Diabetes, High Cholesterol, Diet and Exercise  - Rehab Efforts: Physical Therapy, Occupational Therapy and Speech and Language Pathology,   -Diagnostics: Ordered/Pending HgbA1C to assess blood glucose levels, Lipid Profile to assess cholesterol levels  -Neurosurgery and ophthalmology consulted   -VTE Prophylaxis: None: Reason for No Pharmacological VTE Prophylaxis: heparin 5000units q 8hours  -BP Parameters: SBP < 160  - Nursing Orders: Neuro checks- q1hour, Antiembolic stockings, Swallowing evaluation by Nursing, Seizure precautions, Out of bed BID, Avoid Arroyo catheter, Pneumatic compression device, Stroke Education, Blood glucose target 100-130, Up with assistance    Disposition: home without therapy  Follow up: opthalmology in 2 weeks for neovascularization

## 2017-01-24 NOTE — ASSESSMENT & PLAN NOTE
-Patient reports well controlled (last A1C 5.5 as OP)  -SS insulin as IP   -Stroke risk factor  -hgA1C pending

## 2017-01-24 NOTE — SUBJECTIVE & OBJECTIVE
Neurologic Chief Complaint: Rt eye vision loss    Subjective:     Interval History: Patient is seen for follow-up neurological assessment and treatment recommendations: SYDNEE. Seen by PT/OT. Ready to be d/c home.     HPI, Past Medical, Family, and Social History remains the same as documented in the initial encounter.     Review of Systems  Scheduled Meds:   amlodipine  10 mg Oral Daily    aspirin  81 mg Oral Daily    atorvastatin  40 mg Oral Daily    heparin (porcine)  5,000 Units Subcutaneous Q8H    lisinopril  20 mg Oral Daily    senna-docusate 8.6-50 mg  1 tablet Oral BID    sodium chloride 0.9%  3 mL Intravenous Q8H     Continuous Infusions:   sodium chloride 0.9% 75 mL/hr at 01/24/17 0300     PRN Meds:dextrose 50%, dextrose 50%, gadobutrol, glucagon (human recombinant), glucose, glucose, hydrALAZINE, flu vac [id number] 36mos up(PF), insulin aspart, labetalol, ondansetron, pneumoc 13-kumar conj-dip cr(PF)    Objective:     Vital Signs (Most Recent):  Temp: 97.9 °F (36.6 °C) (01/24/17 0800)  Pulse: 89 (01/24/17 0900)  Resp: 16 (01/24/17 0800)  BP: (!) 131/95 (01/24/17 0800)  SpO2: 97 % (01/24/17 0800)  BP Location: Left arm    Vital Signs Range (Last 24H):  Temp:  [97.5 °F (36.4 °C)-98.6 °F (37 °C)]   Pulse:  []   Resp:  [13-43]   BP: (124-165)/()   SpO2:  [95 %-99 %]   BP Location: Left arm    Physical Exam   Constitutional: He is oriented to person, place, and time. He appears well-developed and well-nourished.   HENT:   Head: Normocephalic and atraumatic.   Eyes: Pupils are equal, round, and reactive to light.   Neck: Normal range of motion. Neck supple.   Cardiovascular: Normal rate.    Pulmonary/Chest: Effort normal and breath sounds normal.   Abdominal: Soft.   Musculoskeletal: Normal range of motion.   Neurological: He is alert and oriented to person, place, and time. GCS eye subscore is 4 - spontaneous. GCS verbal subscore is 5 - oriented. GCS motor subscore is 6 - obeys commands.    Skin: Skin is warm and dry.   Psychiatric: He has a normal mood and affect.       NIH Stroke Scale:  Interval: baseline (upon arrival/admit)  Level of Consciousness: 0 - alert  LOC Questions: 0 - answers both correctly  LOC Commands: 0 - performs both correctly  Best Gaze: 0 - normal  Visual: 2 - complete hemianopia (R eye vision loss)  Facial Palsy: 0 - normal  Motor Left Arm: 0 - no drift  Motor Right Arm: 0 - no drift  Motor Left Le - no drift  Motor Right Le - no drift  Limb Ataxia: 0 - absent  Sensory: 0 - normal  Best Language: 0 - no aphasia  Dysarthria: 0 - normal articulation  Extinction and Inattention: 0 - no neglect  NIH Stroke Scale Total: 2  Blanca Coma Scale:  Best Eye Response: 4 - spontaneous  Best Motor Response: 6 - obeys commands  Best Verbal Response: 5 - oriented  [place] Coma Scale Total: 15  Modified [place] Scale:   Timeline: Prior to symptoms onset  Modified Ricardo Score: 0 - no symptoms        Laboratory:  CMP:   Recent Labs  Lab 17  0549   CALCIUM 8.5*   ALBUMIN 3.7   PROT 6.5      K 3.6   CO2 23      BUN 11   CREATININE 0.8   ALKPHOS 71   ALT 34   AST 23   BILITOT 0.7     CBC:   Recent Labs  Lab 17  0549   WBC 7.31   RBC 4.75   HGB 15.3   HCT 42.4      MCV 89   MCH 32.2*   MCHC 36.1*     Lipid Panel:   Recent Labs  Lab 17  1445   CHOL 137   LDLCALC 53.4*   HDL 57   TRIG 133     Coagulation:   Recent Labs  Lab 17  1445   INR 1.0     Hgb A1C:   Recent Labs  Lab 17  1445   HGBA1C 6.0     TSH:   Recent Labs  Lab 17  1445   TSH 2.100

## 2017-01-24 NOTE — ASSESSMENT & PLAN NOTE
Mr. Corona is a 56yo male who presents with sudden onset of R eye vision loss after sneezing. S/p IV tPA. This is likely an embolic event. No atherosclerosis present on imaging of carotid artery.  No LAE but could be cardioembolic.      -Antithrombotics for secondary stroke prevention: None: Reason: continue asa 325mg qd  -Statins for secondary stroke prevention and hyperlipidemia, if present: continue atorvastatin 10mg qhs  home dose LDL 53.4   -Aggressive risk factor modification: Hypertension, Diabetes, High Cholesterol, Diet and Exercise  - Rehab Efforts: Physical Therapy, Occupational Therapy and Speech and Language Pathology,   -Diagnostics: Ordered/Pending HgbA1C to assess blood glucose levels, Lipid Profile to assess cholesterol levels  -Neurosurgery and ophthalmology consulted   -VTE Prophylaxis: None: Reason for No Pharmacological VTE Prophylaxis: heparin 5000units q 8hours  -BP Parameters: SBP < 160  - Nursing Orders: Neuro checks- q1hour, Antiembolic stockings, Swallowing evaluation by Nursing, Seizure precautions, Out of bed BID, Avoid Arroyo catheter, Pneumatic compression device, Stroke Education, Blood glucose target 100-130, Up with assistance    Disposition: home without therapy  Follow up: opthalmology in 2 weeks for neovascularization

## 2017-01-24 NOTE — DISCHARGE SUMMARY
"[facility]  Vascular Neurology  Comprehensive Stroke Center  Discharge Summary     Summary:     Admit Date: 1/22/2017  2:23 PM    Discharge Date and Time: 1/24/2017 2:43 PM    Attending Physician: SNEHA ARTHUR    Discharge Provider: Rowan Mcgowan MD    History of Present Illness: Mr. Corona is a 56yo male with diabetes, hyperlipidemia and hypertension who is a transfer from OSH presenting with sudden right eye vision loss at 8:05am. Patient reports he sneezed and blew his nose and that is when the vision loss started. He denies other speech changes, no weakness, or sensory changes. CT head & CTA H/N at outside facility without acute infarct or large vessel occlusion. Patient received IV tPA and then transferred to [facility] [place] care. Upon arrival patient reports his R eye vision is slightly improved to where he can see vague outlines of objects but everything is "dark and purple." Neurosurgery and ophthalmology consulted STAT. Awaiting for decision if patient will go to angio.   Patient reports he takes a lipitor 10mg, blood pressure medications, metformin, and ASA 81mg at home. No prior history of stroke.       Hospital Course (synopsis of major diagnoses, care, treatment, and services provided during the course of the hospital stay): Mr. Corona is a 56yo male with diabetes, hypertension and hyperlipidemia who presents with sudden right eye vision loss. Acute onset 8:05 this morning.  He denies other speech changes, no weakness, or sensory changes. CT head & CTA H/N at outside facility without acute infarct or large vessel occlusion. Patient received IV tPA and then transferred to [facility] for further care. Upon arrival patient reports his R eye vision is slightly improved to where he can see vague outlines of objects but everything is "dark and purple." Neurosurgery and ophthalmology consulted STAT. Awaiting for decision if patient will go to angio.   Patient reports he takes a " lipitor 10mg, blood pressure medications, metformin, and ASA 81mg at home.     17 Awaiting complete stroke work up.  Still having difficulty with vision in right eye   Seen by PT/OT, will d/c home with no home health.       NIH Stroke Scale:  Interval: baseline (upon arrival/admit)  Level of Consciousness: 0 - alert  LOC Questions: 0 - answers both correctly  LOC Commands: 0 - performs both correctly  Best Gaze: 0 - normal  Visual: 2 - complete hemianopia (R eye vision loss)  Facial Palsy: 0 - normal  Motor Left Arm: 0 - no drift  Motor Right Arm: 0 - no drift  Motor Left Le - no drift  Motor Right Le - no drift  Limb Ataxia: 0 - absent  Sensory: 0 - normal  Best Language: 0 - no aphasia  Dysarthria: 0 - normal articulation  Extinction and Inattention: 0 - no neglect  NIH Stroke Scale Total: 2  Blanca Coma Scale:  Best Eye Response: 4 - spontaneous  Best Motor Response: 6 - obeys commands  Best Verbal Response: 5 - oriented  Blanca Coma Scale Total: 15  Modified Ricardo Scale:   Timeline: Prior to symptoms onset  Modified Landrum Score: 0 - no symptoms          Assessment/Plan:     Interventions: IV t-PA    Complications: None    Research Candidate?:  No    Neurological deficit at discharge: Rt Visual Field Loss     Disposition: Home or Self Care    Final Active Diagnoses:    Diagnosis Date Noted POA    PRINCIPAL PROBLEM:  Retinal artery branch occlusion of right eye [H34.231] 2017 Unknown    Vision loss of right eye [H54.61] 2017 Yes    Diabetes mellitus type II, controlled [E11.9] 2017 Yes    Essential hypertension [I10] 2017 Yes    Mixed hyperlipidemia [E78.2] 2017 Yes      Problems Resolved During this Admission:    Diagnosis Date Noted Date Resolved POA    Central retinal artery occlusion [H34.10] 2017 Unknown    Stroke due to embolism of left carotid artery [I63.132] 2017 Unknown     * Retinal artery branch occlusion  of right eye  See vision loss of right eye      Vision loss of right eye  Mr. Corona is a 58yo male who presents with sudden onset of R eye vision loss after sneezing. S/p IV tPA. This is likely an embolic event. No atherosclerosis present on imaging of carotid artery.  No LAE but could be cardioembolic.      -Antithrombotics for secondary stroke prevention: None: Reason: continue asa 325mg qd  -Statins for secondary stroke prevention and hyperlipidemia, if present: continue atorvastatin 10mg qhs  home dose LDL 53.4   -Aggressive risk factor modification: Hypertension, Diabetes, High Cholesterol, Diet and Exercise  - Rehab Efforts: Physical Therapy, Occupational Therapy and Speech and Language Pathology,   -Diagnostics: Ordered/Pending HgbA1C to assess blood glucose levels, Lipid Profile to assess cholesterol levels  -Neurosurgery and ophthalmology consulted   -VTE Prophylaxis: None: Reason for No Pharmacological VTE Prophylaxis: heparin 5000units q 8hours  -BP Parameters: SBP < 160  - Nursing Orders: Neuro checks- q1hour, Antiembolic stockings, Swallowing evaluation by Nursing, Seizure precautions, Out of bed BID, Avoid Arroyo catheter, Pneumatic compression device, Stroke Education, Blood glucose target 100-130, Up with assistance    Disposition: home without therapy  Follow up: opthalmology in 2 weeks for neovascularization         Diabetes mellitus type II, controlled  -Patient reports well controlled (last A1C 5.5 as OP)  -SS insulin as IP   -Stroke risk factor  -hgA1C pending     Essential hypertension  -Stroke risk factor  -BP goal currently <160    Mixed hyperlipidemia  -continue Atorvastatin 40 mg at home  -LDL 53      Recommendations:     Post-discharge complication risks: None    Stroke Education given to: patient    Follow-up in Stroke Clinic in 7 days    Discharge Plan:  Antithrombotics: Aspirin 325mg  Statin: Atorvastatin 20mg    Follow Up:    Patient Instructions:     Ambulatory Referral to  Ophthalmology   Referral Priority: Routine Referral Type: Consultation   Referral Reason: Specialty Services Required    Requested Specialty: Ophthalmology    Number of Visits Requested: 1      Diet Diabetic 1800 Calories       Medications:  Reconciled Home Medications:   Discharge Medication List as of 1/24/2017 11:39 AM      START taking these medications    Details   aspirin 325 MG tablet Take 1 tablet (325 mg total) by mouth once daily., Starting 1/24/2017, Until Wed 1/24/18, No Print         CONTINUE these medications which have CHANGED    Details   atorvastatin (LIPITOR) 40 MG tablet Take 0.5 tablets (20 mg total) by mouth once daily., Starting 1/24/2017, Until Wed 1/24/18, Normal         CONTINUE these medications which have NOT CHANGED    Details   amlodipine (NORVASC) 10 MG tablet Take 10 mg by mouth once daily., Until Discontinued, Historical Med      INV LISINOPRIL-HCTZ 20-25 Take 1 tablet by mouth once daily. For investigational use only., Until Discontinued, Historical Med      metformin (GLUCOPHAGE) 850 MG tablet Take 850 mg by mouth 2 (two) times daily with meals., Until Discontinued, Historical Med      terazosin (HYTRIN) 5 MG capsule Take 5 mg by mouth every evening., Until Discontinued, Historical Med         STOP taking these medications       aspirin (ECOTRIN) 81 MG EC tablet Comments:   Reason for Stopping:               Rowan Mcgowan MD  Comprehensive Stroke Center  Department of Vascular Neurology   [facility]

## 2017-01-24 NOTE — PROGRESS NOTES
[facility]  Vascular Neurology  Comprehensive Stroke Center  Progress Note      Neurologic Chief Complaint: Rt eye vision loss    Subjective:     Interval History: Patient is seen for follow-up neurological assessment and treatment recommendations: SYDNEE. Seen by PT/OT. Ready to be d/c home.     HPI, Past Medical, Family, and Social History remains the same as documented in the initial encounter.     Review of Systems  Scheduled Meds:   amlodipine  10 mg Oral Daily    aspirin  81 mg Oral Daily    atorvastatin  40 mg Oral Daily    heparin (porcine)  5,000 Units Subcutaneous Q8H    lisinopril  20 mg Oral Daily    senna-docusate 8.6-50 mg  1 tablet Oral BID    sodium chloride 0.9%  3 mL Intravenous Q8H     Continuous Infusions:   sodium chloride 0.9% 75 mL/hr at 01/24/17 0300     PRN Meds:dextrose 50%, dextrose 50%, gadobutrol, glucagon (human recombinant), glucose, glucose, hydrALAZINE, flu vac [id number] 36mos up(PF), insulin aspart, labetalol, ondansetron, pneumoc 13-kumar conj-dip cr(PF)    Objective:     Vital Signs (Most Recent):  Temp: 97.9 °F (36.6 °C) (01/24/17 0800)  Pulse: 89 (01/24/17 0900)  Resp: 16 (01/24/17 0800)  BP: (!) 131/95 (01/24/17 0800)  SpO2: 97 % (01/24/17 0800)  BP Location: Left arm    Vital Signs Range (Last 24H):  Temp:  [97.5 °F (36.4 °C)-98.6 °F (37 °C)]   Pulse:  []   Resp:  [13-43]   BP: (124-165)/()   SpO2:  [95 %-99 %]   BP Location: Left arm    Physical Exam   Constitutional: He is oriented to person, place, and time. He appears well-developed and well-nourished.   HENT:   Head: Normocephalic and atraumatic.   Eyes: Pupils are equal, round, and reactive to light.   Neck: Normal range of motion. Neck supple.   Cardiovascular: Normal rate.    Pulmonary/Chest: Effort normal and breath sounds normal.   Abdominal: Soft.   Musculoskeletal: Normal range of motion.   Neurological: He is alert and oriented to person, place, and time. GCS eye subscore is 4  - spontaneous. GCS verbal subscore is 5 - oriented. GCS motor subscore is 6 - obeys commands.   Skin: Skin is warm and dry.   Psychiatric: He has a normal mood and affect.       NIH Stroke Scale:  Interval: baseline (upon arrival/admit)  Level of Consciousness: 0 - alert  LOC Questions: 0 - answers both correctly  LOC Commands: 0 - performs both correctly  Best Gaze: 0 - normal  Visual: 2 - complete hemianopia (R eye vision loss)  Facial Palsy: 0 - normal  Motor Left Arm: 0 - no drift  Motor Right Arm: 0 - no drift  Motor Left Le - no drift  Motor Right Le - no drift  Limb Ataxia: 0 - absent  Sensory: 0 - normal  Best Language: 0 - no aphasia  Dysarthria: 0 - normal articulation  Extinction and Inattention: 0 - no neglect  NIH Stroke Scale Total: 2  [place] Coma Scale:  Best Eye Response: 4 - spontaneous  Best Motor Response: 6 - obeys commands  Best Verbal Response: 5 - oriented  Blanca Coma Scale Total: 15  Modified Ricardo Scale:   Timeline: Prior to symptoms onset  Modified [place] Score: 0 - no symptoms        Laboratory:  CMP:   Recent Labs  Lab 17  0549   CALCIUM 8.5*   ALBUMIN 3.7   PROT 6.5      K 3.6   CO2 23      BUN 11   CREATININE 0.8   ALKPHOS 71   ALT 34   AST 23   BILITOT 0.7     CBC:   Recent Labs  Lab 17  0549   WBC 7.31   RBC 4.75   HGB 15.3   HCT 42.4      MCV 89   MCH 32.2*   MCHC 36.1*     Lipid Panel:   Recent Labs  Lab 17  1445   CHOL 137   LDLCALC 53.4*   HDL 57   TRIG 133     Coagulation:   Recent Labs  Lab 17  1445   INR 1.0     Hgb A1C:   Recent Labs  Lab 17  1445   HGBA1C 6.0     TSH:   Recent Labs  Lab 17  1445   TSH 2.100     Assessment/Plan:     Mr. Corona is a 58yo male with diabetes, hypertension and hyperlipidemia who presents with sudden right eye vision loss. Acute onset 8:05 this morning.  He denies other speech changes, no weakness, or sensory changes. CT head & CTA H/N at outside facility without acute infarct or  "large vessel occlusion. Patient received IV tPA and then [place] [facility] [place]. Upon arrival patient reports his R eye vision is slightly improved to where he can see vague outlines of objects but everything is "dark and purple." Neurosurgery and ophthalmology consulted STAT. Awaiting for decision if patient will go to angio.   Patient reports he takes a lipitor 10mg, blood pressure medications, metformin, and ASA 81mg at home.     01/23/17 Awaiting complete stroke work up.  Still having difficulty with vision in right eye  0./24/17 Seen by PT/OT, will d/c home with no home health.       * Retinal artery branch occlusion of right eye  See vision loss of right eye      Vision loss of right eye  Mr. Corona is a 58yo male who presents with sudden onset of R eye vision loss after sneezing. S/p IV tPA. This is likely an embolic event. No atherosclerosis present on imaging of carotid artery.  No LAE but could be cardioembolic.      -Antithrombotics for secondary stroke prevention: None: Reason: continue asa 325mg qd  -Statins for secondary stroke prevention and hyperlipidemia, if present: continue atorvastatin 10mg qhs  home dose LDL 53.4   -Aggressive risk factor modification: Hypertension, Diabetes, High Cholesterol, Diet and Exercise  - Rehab Efforts: Physical Therapy, Occupational Therapy and Speech and Language Pathology,   -Diagnostics: Ordered/Pending HgbA1C to assess blood glucose levels, Lipid Profile to assess cholesterol levels  -Neurosurgery and ophthalmology consulted   -VTE Prophylaxis: None: Reason for No Pharmacological VTE Prophylaxis: heparin 5000units q 8hours  -BP Parameters: SBP < 160  - Nursing Orders: Neuro checks- q1hour, Antiembolic stockings, Swallowing evaluation by Nursing, Seizure precautions, Out of bed BID, Avoid Arroyo catheter, Pneumatic compression device, Stroke Education, Blood glucose target 100-130, Up with assistance    Disposition: home without therapy  Follow up: " opthalmology in 2 weeks for neovascularization         Diabetes mellitus type II, controlled  -Patient reports well controlled (last A1C 5.5 as OP)  -SS insulin as IP   -Stroke risk factor  hgA1C pending     Essential hypertension  -Stroke risk factor  -BP goal currently <160    Mixed hyperlipidemia  -continue Atorvastatin 40 mg at home  -LDL 53      Rowan Mcgowan MD  Comprehensive Stroke Center  Department of Vascular Neurology   [facility]

## 2017-01-24 NOTE — SUBJECTIVE & OBJECTIVE
Neurologic Chief Complaint:Right Eye Vision Loss    Subjective:     Interval History: Patient is seen for follow-up neurological assessment and treatment recommendations: Found to have CRAO.      HPI, Past Medical, Family, and Social History remains the same as documented in the initial encounter.     Review of Systems   Constitutional: Negative for chills and fever.   Respiratory: Negative for cough and shortness of breath.    Gastrointestinal: Negative for diarrhea and vomiting.     Scheduled Meds:   amlodipine  10 mg Oral Daily    [START ON 1/24/2017] aspirin  81 mg Oral Daily    atorvastatin  40 mg Oral Daily    [START ON 1/24/2017] heparin (porcine)  5,000 Units Subcutaneous Q8H    lisinopril  20 mg Oral Daily    senna-docusate 8.6-50 mg  1 tablet Oral BID    sodium chloride 0.9%  3 mL Intravenous Q8H     Continuous Infusions:   sodium chloride 0.9% 75 mL/hr at 01/23/17 0600     PRN Meds:dextrose 50%, dextrose 50%, gadobutrol, glucagon (human recombinant), glucose, glucose, hydrALAZINE, [START ON 1/24/2017] flu vac [id number] 36mos up(PF), insulin aspart, labetalol, ondansetron, [START ON 1/24/2017] pneumoc 13-kumar conj-dip cr(PF)    Objective:     Vital Signs (Most Recent):  Temp: 98.6 °F (37 °C) (01/23/17 1500)  Pulse: 84 (01/23/17 1800)  Resp: (!) 26 (01/23/17 1800)  BP: (!) 165/100 (01/23/17 1800)  SpO2: 98 % (01/23/17 1800)  BP Location: Right arm    Vital Signs Range (Last 24H):  Temp:  [98 °F (36.7 °C)-98.6 °F (37 °C)]   Pulse:  []   Resp:  [11-36]   BP: (123-180)/()   SpO2:  [94 %-98 %]   BP Location: Right arm    Physical Exam   Constitutional: He appears well-developed and well-nourished.   HENT:   Head: Normocephalic and atraumatic.   Eyes: Pupils are equal, round, and reactive to light.   Pulmonary/Chest: Effort normal. No respiratory distress.       Neurological Exam:   LOC: alert and follows requests  Language: No aphasia  Speech: No dysarthria  Orientation: Person, Place,  Time  Memory: Recent memory intact, Remote memory intact, Age correct, Month correct  Visual Fields (CN II): Hemianopsia  right  EOM (CN III, IV, VI): Full/intact  Oculocephalics: not examined   Pupils (CN III, IV, VI): PERRL  Facial Sensation (CN V): Symmetric  Facial Movement (CN VII): symmetric facial expression  Motor*: Arm Left:  Normal (5/5), Leg Left:   Normal (5/5), Arm Right:   Normal (5/5), Leg Right:   Normal (5/5)  Sensation: intact to light touch, temperature and vibration  Tone: Arm-Left: normal; Leg-Left: normal; Arm-Right: normal; Leg-Right: normal    NIH Stroke Scale:    Level of Consciousness: 0 - alert  LOC Questions: 0 - answers both correctly  LOC Commands: 0 - performs both correctly  Best Gaze: 0 - normal  Visual: 2 - complete hemianopia  Facial Palsy: 0 - normal  Motor Left Arm: 0 - no drift  Motor Right Arm: 0 - no drift  Motor Left Le - no drift  Motor Right Le - no drift  Limb Ataxia: 0 - absent  Sensory: 0 - normal  Best Language: 0 - no aphasia  Dysarthria: 0 - normal articulation  Extinction and Inattention: 0 - no neglect  NIH Stroke Scale Total: 2      Laboratory:  CMP:   Recent Labs  Lab 17  0341   CALCIUM 8.7   ALBUMIN 3.7   PROT 6.5      K 3.5   CO2 25      BUN 13   CREATININE 0.8   ALKPHOS 66   ALT 38   AST 29   BILITOT 1.1*     CBC:   Recent Labs  Lab 17  0341   WBC 5.14   RBC 4.73   HGB 15.2   HCT 42.0      MCV 89   MCH 32.1*   MCHC 36.2*     Coagulation:   Recent Labs  Lab 17  1445   INR 1.0     TSH:   Recent Labs  Lab 17  1445   TSH 2.100       Diagnostic Results:  MRI Brain 17  -no acute intracranial abnormality    MRA head/neck 17  No significant stenosis compromising cerebral blood flow     Carotid u/s 17  No stenosis bilaterally     Echo 17  -no LAE  -EF 55%

## 2017-01-24 NOTE — SUBJECTIVE & OBJECTIVE
NIH Stroke Scale:  Interval: baseline (upon arrival/admit)  Level of Consciousness: 0 - alert  LOC Questions: 0 - answers both correctly  LOC Commands: 0 - performs both correctly  Best Gaze: 0 - normal  Visual: 2 - complete hemianopia (R eye vision loss)  Facial Palsy: 0 - normal  Motor Left Arm: 0 - no drift  Motor Right Arm: 0 - no drift  Motor Left Le - no drift  Motor Right Le - no drift  Limb Ataxia: 0 - absent  Sensory: 0 - normal  Best Language: 0 - no aphasia  Dysarthria: 0 - normal articulation  Extinction and Inattention: 0 - no neglect  NIH Stroke Scale Total: 2  Blanca Coma Scale:  Best Eye Response: 4 - spontaneous  Best Motor Response: 6 - obeys commands  Best Verbal Response: 5 - oriented  Blanca Coma Scale Total: 15  Modified [place] Scale:   Timeline: Prior to symptoms onset  Modified Ricardo Score: 0 - no symptoms

## 2017-01-25 ENCOUNTER — TELEPHONE (OUTPATIENT)
Dept: NEUROSURGERY | Facility: HOSPITAL | Age: 57
End: 2017-01-25

## 2017-01-25 NOTE — TELEPHONE ENCOUNTER
"Attempted to contact patient via number on file.  No answer.  Received message "This person has a voice mailbox that has not been set up yet.  Please try your call again later."  Will try again later.   "

## 2017-01-26 ENCOUNTER — NURSE TRIAGE (OUTPATIENT)
Dept: ADMINISTRATIVE | Facility: CLINIC | Age: 57
End: 2017-01-26

## 2017-01-26 NOTE — TELEPHONE ENCOUNTER
Reason for Disposition   No answer.  First attempt to contact caller.  Follow-up call scheduled within 15 minutes.     No answer x 2 attempts; no voice mail options.    Protocols used: ST NO CONTACT OR DUPLICATE CONTACT CALL-A-AH

## 2017-01-27 ENCOUNTER — TELEPHONE (OUTPATIENT)
Dept: NEUROSURGERY | Facility: HOSPITAL | Age: 57
End: 2017-01-27

## 2017-01-27 NOTE — TELEPHONE ENCOUNTER
"Attempted to contact patient via number on file. No answer. Received message "This person has a voice mailbox that has not been set up yet. Please try your call again later." Will try again later  "

## 2017-01-30 ENCOUNTER — TELEPHONE (OUTPATIENT)
Dept: NEUROSURGERY | Facility: HOSPITAL | Age: 57
End: 2017-01-30

## 2017-01-30 NOTE — TELEPHONE ENCOUNTER
"Attempted to contact patient via number on file. No answer. Received message "This person has a voice mailbox that has not been set up yet. Please try your call again later." Third unsuccessful attempt.   "